# Patient Record
Sex: MALE | Race: WHITE | NOT HISPANIC OR LATINO
[De-identification: names, ages, dates, MRNs, and addresses within clinical notes are randomized per-mention and may not be internally consistent; named-entity substitution may affect disease eponyms.]

---

## 2018-12-02 ENCOUNTER — RECORD ABSTRACTING (OUTPATIENT)
Age: 73
End: 2018-12-02

## 2018-12-02 DIAGNOSIS — Z80.0 FAMILY HISTORY OF MALIGNANT NEOPLASM OF DIGESTIVE ORGANS: ICD-10-CM

## 2018-12-02 DIAGNOSIS — E66.3 OVERWEIGHT: ICD-10-CM

## 2018-12-02 DIAGNOSIS — Z82.49 FAMILY HISTORY OF ISCHEMIC HEART DISEASE AND OTHER DISEASES OF THE CIRCULATORY SYSTEM: ICD-10-CM

## 2018-12-02 DIAGNOSIS — Z86.39 PERSONAL HISTORY OF OTHER ENDOCRINE, NUTRITIONAL AND METABOLIC DISEASE: ICD-10-CM

## 2018-12-02 DIAGNOSIS — Z82.0 FAMILY HISTORY OF EPILEPSY AND OTHER DISEASES OF THE NERVOUS SYSTEM: ICD-10-CM

## 2018-12-02 DIAGNOSIS — E66.09 OTHER OBESITY DUE TO EXCESS CALORIES: ICD-10-CM

## 2018-12-02 DIAGNOSIS — Z83.3 FAMILY HISTORY OF DIABETES MELLITUS: ICD-10-CM

## 2018-12-02 RX ORDER — BENZOCAINE/BENZALKONIUM/ALOE/E 5 %-0.13 %
10 CREAM (GRAM) TOPICAL
Refills: 0 | Status: ACTIVE | COMMUNITY

## 2018-12-02 RX ORDER — VITS A,C,E/LUTEIN/MINERALS 300MCG-200
TABLET ORAL
Refills: 0 | Status: ACTIVE | COMMUNITY

## 2018-12-02 RX ORDER — OMEGA-3/DHA/EPA/FISH OIL 300-1000MG
1000 CAPSULE ORAL
Refills: 0 | Status: ACTIVE | COMMUNITY

## 2018-12-13 ENCOUNTER — APPOINTMENT (OUTPATIENT)
Dept: ENDOCRINOLOGY | Facility: CLINIC | Age: 73
End: 2018-12-13
Payer: MEDICARE

## 2018-12-13 ENCOUNTER — RX RENEWAL (OUTPATIENT)
Age: 73
End: 2018-12-13

## 2018-12-13 VITALS
HEART RATE: 70 BPM | SYSTOLIC BLOOD PRESSURE: 126 MMHG | HEIGHT: 73 IN | BODY MASS INDEX: 30.22 KG/M2 | WEIGHT: 228 LBS | DIASTOLIC BLOOD PRESSURE: 64 MMHG

## 2018-12-13 PROCEDURE — 99214 OFFICE O/P EST MOD 30 MIN: CPT | Mod: 25

## 2018-12-13 PROCEDURE — 95250 CONT GLUC MNTR PHYS/QHP EQP: CPT

## 2018-12-13 RX ORDER — LIRAGLUTIDE 6 MG/ML
18 INJECTION SUBCUTANEOUS DAILY
Refills: 0 | Status: DISCONTINUED | COMMUNITY
End: 2018-12-13

## 2019-02-20 ENCOUNTER — MEDICATION RENEWAL (OUTPATIENT)
Age: 74
End: 2019-02-20

## 2019-03-14 ENCOUNTER — APPOINTMENT (OUTPATIENT)
Dept: ENDOCRINOLOGY | Facility: CLINIC | Age: 74
End: 2019-03-14

## 2019-03-15 ENCOUNTER — APPOINTMENT (OUTPATIENT)
Dept: ENDOCRINOLOGY | Facility: CLINIC | Age: 74
End: 2019-03-15

## 2019-04-03 ENCOUNTER — RX RENEWAL (OUTPATIENT)
Age: 74
End: 2019-04-03

## 2019-04-24 ENCOUNTER — RX RENEWAL (OUTPATIENT)
Age: 74
End: 2019-04-24

## 2019-04-24 RX ORDER — INSULIN LISPRO 200 [IU]/ML
200 INJECTION, SOLUTION SUBCUTANEOUS
Qty: 12 | Refills: 2 | Status: DISCONTINUED | COMMUNITY
Start: 2019-04-03 | End: 2019-04-24

## 2019-04-24 RX ORDER — INSULIN LISPRO 100 [IU]/ML
100 INJECTION, SOLUTION INTRAVENOUS; SUBCUTANEOUS
Refills: 0 | Status: DISCONTINUED | COMMUNITY
End: 2019-04-24

## 2019-05-11 ENCOUNTER — RX RENEWAL (OUTPATIENT)
Age: 74
End: 2019-05-11

## 2019-05-29 ENCOUNTER — RX RENEWAL (OUTPATIENT)
Age: 74
End: 2019-05-29

## 2019-07-10 ENCOUNTER — APPOINTMENT (OUTPATIENT)
Dept: ENDOCRINOLOGY | Facility: CLINIC | Age: 74
End: 2019-07-10
Payer: MEDICARE

## 2019-07-10 VITALS
DIASTOLIC BLOOD PRESSURE: 82 MMHG | SYSTOLIC BLOOD PRESSURE: 132 MMHG | BODY MASS INDEX: 30.48 KG/M2 | HEART RATE: 66 BPM | WEIGHT: 230 LBS | HEIGHT: 73 IN

## 2019-07-10 LAB
GLUCOSE BLDC GLUCOMTR-MCNC: 88
HBA1C MFR BLD HPLC: 8

## 2019-07-10 PROCEDURE — 99215 OFFICE O/P EST HI 40 MIN: CPT | Mod: 25

## 2019-07-10 PROCEDURE — 82962 GLUCOSE BLOOD TEST: CPT

## 2019-07-10 PROCEDURE — 83036 HEMOGLOBIN GLYCOSYLATED A1C: CPT | Mod: QW

## 2019-07-10 PROCEDURE — 95251 CONT GLUC MNTR ANALYSIS I&R: CPT

## 2019-07-10 RX ORDER — SIMVASTATIN 40 MG/1
40 TABLET, FILM COATED ORAL DAILY
Refills: 0 | Status: DISCONTINUED | COMMUNITY
End: 2019-07-10

## 2019-07-10 RX ORDER — DULAGLUTIDE 1.5 MG/.5ML
1.5 INJECTION, SOLUTION SUBCUTANEOUS
Refills: 0 | Status: DISCONTINUED | COMMUNITY
End: 2019-07-10

## 2019-07-10 RX ORDER — INSULIN GLARGINE 300 U/ML
300 INJECTION, SOLUTION SUBCUTANEOUS AT BEDTIME
Refills: 0 | Status: DISCONTINUED | COMMUNITY
End: 2019-07-10

## 2019-07-12 NOTE — HISTORY OF PRESENT ILLNESS
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 [place]\par        9/18 reviewed \par        Severe Nonproliferative Diabetes in OD/OS stable. \par        Cystoid macular edema stable. \par        does not want free style adenike as he is too active , however willing to try dexcom \par        type 2 DM for almost 30 years "early 90's " first few years on Metformin only, complicated by PDR and neuropathy \par        Humalog Subcutaneous, used to take before breakfast 15-20units..........35-45 some times 50 units for supper\par        last Hga1c 8.7 on 7/20/18, 8.6 on 10/18\par        labs reviewed\par        tried Victoza made him sick \par        Toujeo 30units qhs changed few days ago see phone notes\par        Metformin ER 500mg 2tab bid \par        Trulicity 1.5mg once a week for 2 weeks dose increased, has had some GERD\par        Humalog SS \par        Humalog before BREAKFAST:\par        <150 take 0 units \par        151-200 take 2units \par        201-250 take 3units \par        251-300 take 4units \par        301-350 take 5units \par        351-400 take 6units \par        401-450 take 7units\par        >450 take 8 units and call doctor's office. \par        Humalog before LUNCH:\par        <120 take 0 units \par        121-170 take 2units \par        171-220 take 4units \par        221-270 take 6units \par        271-320 take 8units \par        321-370 take 10units \par        371-420 take 12units\par        >420 take 14 units and call doctor's office. \par        Humalog before DINNER:\par        <80 TAKE 0 UNITS\par         TAKE 2 UNITS\par        101-150 TAKE 4 UNITS\par        151-200 TAKE 6 UNITS\par        201-250 TAKE 8 UNITS\par        251-300 TAKE 10 UNITS \par        301-350 TAKE 12 UNITS\par        351-400 TAKE 14 UNITS \par        Fax BG log in 1 week. \par        Checks BS tid none at bedtime\par        avg BS  with no pattern \par        has lows once a week\par        no bedtime sugars checked\par        feels low some days and  per pt \par        Microvascular complications: \par        Nephropathy denies normal Cr 3/16\par        Neuropathy symptoms, denies seen Podiatry long time ago was a waste of time per pt \par        Retinopathy yes s/p lasser tx and eye shots sees Retina specialist Dr [facility] \par        Macrovascular complications: \par        HTN on Atacand-HCTZ, Amlodipine, Coreg CR high today repeated better\par        CAD/CVA denies\par        Lipids at goal on Simvastatin 40mg qhs with LFT's normal\par        denies h/o pancreatitis\par        denies FHx medullary thyroid cancer.

## 2019-07-12 NOTE — PHYSICAL EXAM
[Alert] : alert [No Acute Distress] : no acute distress [Well Nourished] : well nourished [Well Developed] : well developed [EOMI] : extra ocular movement intact [Normal Sclera/Conjunctiva] : normal sclera/conjunctiva [No Proptosis] : no proptosis [Normal Oropharynx] : the oropharynx was normal [Thyroid Not Enlarged] : the thyroid was not enlarged [No Respiratory Distress] : no respiratory distress [No Thyroid Nodules] : there were no palpable thyroid nodules [No Accessory Muscle Use] : no accessory muscle use [Clear to Auscultation] : lungs were clear to auscultation bilaterally [Normal Rate] : heart rate was normal  [Normal S1, S2] : normal S1 and S2 [Regular Rhythm] : with a regular rhythm [Pedal Pulses Normal] : the pedal pulses are present [No Edema] : there was no peripheral edema [Normal Bowel Sounds] : normal bowel sounds [Not Tender] : non-tender [Soft] : abdomen soft [Not Distended] : not distended [Post Cervical Nodes] : posterior cervical nodes [Anterior Cervical Nodes] : anterior cervical nodes [Axillary Nodes] : axillary nodes [No Spinal Tenderness] : no spinal tenderness [Spine Straight] : spine straight [No Stigmata of Cushings Syndrome] : no stigmata of cushings syndrome [Normal Gait] : normal gait [Normal Strength/Tone] : muscle strength and tone were normal [No Rash] : no rash [Acanthosis Nigricans] : no acanthosis nigricans [Right Foot Was Examined] : right foot ~C was examined [Left Foot Was Examined] : left foot ~C was examined [Normal] : normal [Full ROM] : with full range of motion [2+] : 2+ in the dorsalis pedis [Vibration Dec.] : normal vibratory sensation at the level of the toes [Diminished Throughout Both Feet] : normal tactile sensation with monofilament testing throughout both feet [Position Sense Dec.] : normal position sense at the level of the toes [Normal Sensation on Monofilament Testing] : normal sensation on monofilament testing of lower extremities [Normal Reflexes] : deep tendon reflexes were 2+ and symmetric [No Tremors] : no tremors [Oriented x3] : oriented to person, place, and time

## 2019-07-15 ENCOUNTER — MEDICATION RENEWAL (OUTPATIENT)
Age: 74
End: 2019-07-15

## 2019-08-14 ENCOUNTER — APPOINTMENT (OUTPATIENT)
Dept: ENDOCRINOLOGY | Facility: CLINIC | Age: 74
End: 2019-08-14
Payer: MEDICARE

## 2019-08-14 VITALS
HEART RATE: 62 BPM | DIASTOLIC BLOOD PRESSURE: 70 MMHG | WEIGHT: 230 LBS | HEIGHT: 73 IN | SYSTOLIC BLOOD PRESSURE: 122 MMHG | BODY MASS INDEX: 30.48 KG/M2 | OXYGEN SATURATION: 98 % | RESPIRATION RATE: 16 BRPM

## 2019-08-14 LAB — GLUCOSE BLDC GLUCOMTR-MCNC: 97

## 2019-08-14 PROCEDURE — 95251 CONT GLUC MNTR ANALYSIS I&R: CPT

## 2019-08-14 PROCEDURE — 99215 OFFICE O/P EST HI 40 MIN: CPT | Mod: 25

## 2019-08-14 PROCEDURE — 82962 GLUCOSE BLOOD TEST: CPT

## 2019-08-19 ENCOUNTER — MEDICATION RENEWAL (OUTPATIENT)
Age: 74
End: 2019-08-19

## 2019-08-23 NOTE — HISTORY OF PRESENT ILLNESS
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 after dinner per tp he was never that low, feels low with BS 80s\par never got dexcom, he received 2 calls from the company and was told that the Dexcom will be shipped to his home a month ago, however never came \par        coming for a sooner f/u as he started Omnipod insulin pump , likes it very much thankful\par insulin pump downloaded and reviewed with the pt, copy scanned in the chart \par    has bedtime snacks with no bolusing , also not good with counting CHO does aproximation "big meal 75g CHO, small meal snack et"\par        saw optho [facility] Dr. [place]\par        9/18 reviewed \par        Severe Nonproliferative Diabetes in OD/OS stable. \par        Cystoid macular edema stable. \par        does not want free style adenike as he is too active , however willing to try dexcom \par        type 2 DM for almost 30 years "early 90's " first few years on Metformin only, complicated by PDR and neuropathy \par        Humalog Subcutaneous, used to take before breakfast 15-20units..........35-45 some times 50 units for supper\par        last Hga1c 8.7 on 7/20/18, 8.6 on 10/18\par        labs reviewed\par        tried Victoza made him sick \par        Toujeo 30units qhs changed few days ago see phone notes\par        Metformin ER 500mg 2tab bid \par        Trulicity 1.5mg once a week for 2 weeks dose increased, has had some GERD\par        Humalog SS \par        Humalog before BREAKFAST:\par        <150 take 0 units \par        151-200 take 2units \par        201-250 take 3units \par        251-300 take 4units \par        301-350 take 5units \par        351-400 take 6units \par        401-450 take 7units\par        >450 take 8 units and call doctor's office. \par        Humalog before LUNCH:\par        <120 take 0 units \par        121-170 take 2units \par        171-220 take 4units \par        221-270 take 6units \par        271-320 take 8units \par        321-370 take 10units \par        371-420 take 12units\par        >420 take 14 units and call doctor's office. \par        Humalog before DINNER:\par        <80 TAKE 0 UNITS\par         TAKE 2 UNITS\par        101-150 TAKE 4 UNITS\par        151-200 TAKE 6 UNITS\par        201-250 TAKE 8 UNITS\par        251-300 TAKE 10 UNITS \par        301-350 TAKE 12 UNITS\par        351-400 TAKE 14 UNITS \par        Fax BG log in 1 week. \par        Checks BS tid none at bedtime\par        avg BS  with no pattern \par        has lows once a week\par        no bedtime sugars checked\par        feels low some days and  per pt \par        Microvascular complications: \par        Nephropathy denies normal Cr 3/16\par        Neuropathy symptoms, denies seen Podiatry long time ago was a waste of time per pt \par        Retinopathy yes s/p lasser tx and eye shots sees Retina specialist Dr [facility] \par        Macrovascular complications: \par        HTN on Atacand-HCTZ, Amlodipine, Coreg CR high today repeated better\par        CAD/CVA denies\par        Lipids at goal on Simvastatin 40mg qhs with LFT's normal\par        denies h/o pancreatitis\par        denies FHx medullary thyroid cancer.

## 2019-08-23 NOTE — HISTORY OF PRESENT ILLNESS
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 after dinner per tp he was never that low, feels low with BS 80s\par never got dexcom, he received 2 calls from the company and was told that the Dexcom will be shipped to his home a month ago, however never came \par        coming for a sooner f/u as he started Omnipod insulin pump , likes it very much thankful\par insulin pump downloaded and reviewed with the pt, copy scanned in the chart \par    has bedtime snacks with no bolusing , also not good with counting CHO does aproximation "big meal 75g CHO, small meal snack et"\par        saw optho [facility] Dr. [place]\par        9/18 reviewed \par        Severe Nonproliferative Diabetes in OD/OS stable. \par        Cystoid macular edema stable. \par        does not want free style adenike as he is too active , however willing to try dexcom \par        type 2 DM for almost 30 years "early 90's " first few years on Metformin only, complicated by PDR and neuropathy \par        Humalog Subcutaneous, used to take before breakfast 15-20units..........35-45 some times 50 units for supper\par        last Hga1c 8.7 on 7/20/18, 8.6 on 10/18\par        labs reviewed\par        tried Victoza made him sick \par        Toujeo 30units qhs changed few days ago see phone notes\par        Metformin ER 500mg 2tab bid \par        Trulicity 1.5mg once a week for 2 weeks dose increased, has had some GERD\par        Humalog SS \par        Humalog before BREAKFAST:\par        <150 take 0 units \par        151-200 take 2units \par        201-250 take 3units \par        251-300 take 4units \par        301-350 take 5units \par        351-400 take 6units \par        401-450 take 7units\par        >450 take 8 units and call doctor's office. \par        Humalog before LUNCH:\par        <120 take 0 units \par        121-170 take 2units \par        171-220 take 4units \par        221-270 take 6units \par        271-320 take 8units \par        321-370 take 10units \par        371-420 take 12units\par        >420 take 14 units and call doctor's office. \par        Humalog before DINNER:\par        <80 TAKE 0 UNITS\par         TAKE 2 UNITS\par        101-150 TAKE 4 UNITS\par        151-200 TAKE 6 UNITS\par        201-250 TAKE 8 UNITS\par        251-300 TAKE 10 UNITS \par        301-350 TAKE 12 UNITS\par        351-400 TAKE 14 UNITS \par        Fax BG log in 1 week. \par        Checks BS tid none at bedtime\par        avg BS  with no pattern \par        has lows once a week\par        no bedtime sugars checked\par        feels low some days and  per pt \par        Microvascular complications: \par        Nephropathy denies normal Cr 3/16\par        Neuropathy symptoms, denies seen Podiatry long time ago was a waste of time per pt \par        Retinopathy yes s/p lasser tx and eye shots sees Retina specialist Dr [facility] \par        Macrovascular complications: \par        HTN on Atacand-HCTZ, Amlodipine, Coreg CR high today repeated better\par        CAD/CVA denies\par        Lipids at goal on Simvastatin 40mg qhs with LFT's normal\par        denies h/o pancreatitis\par        denies FHx medullary thyroid cancer.

## 2019-08-23 NOTE — PHYSICAL EXAM
[Alert] : alert [No Acute Distress] : no acute distress [Well Nourished] : well nourished [Well Developed] : well developed [Normal Sclera/Conjunctiva] : normal sclera/conjunctiva [No Proptosis] : no proptosis [EOMI] : extra ocular movement intact [Normal Oropharynx] : the oropharynx was normal [No Thyroid Nodules] : there were no palpable thyroid nodules [Thyroid Not Enlarged] : the thyroid was not enlarged [No Respiratory Distress] : no respiratory distress [No Accessory Muscle Use] : no accessory muscle use [Clear to Auscultation] : lungs were clear to auscultation bilaterally [Normal Rate] : heart rate was normal  [Regular Rhythm] : with a regular rhythm [Normal S1, S2] : normal S1 and S2 [Pedal Pulses Normal] : the pedal pulses are present [No Edema] : there was no peripheral edema [Normal Bowel Sounds] : normal bowel sounds [Not Tender] : non-tender [Soft] : abdomen soft [Not Distended] : not distended [Post Cervical Nodes] : posterior cervical nodes [Anterior Cervical Nodes] : anterior cervical nodes [Axillary Nodes] : axillary nodes [Spine Straight] : spine straight [No Spinal Tenderness] : no spinal tenderness [No Stigmata of Cushings Syndrome] : no stigmata of cushings syndrome [Normal Gait] : normal gait [Normal Strength/Tone] : muscle strength and tone were normal [No Rash] : no rash [Right Foot Was Examined] : right foot ~C was examined [Left Foot Was Examined] : left foot ~C was examined [Normal] : normal [Full ROM] : with full range of motion [2+] : 2+ in the dorsalis pedis [Normal Reflexes] : deep tendon reflexes were 2+ and symmetric [No Tremors] : no tremors [Oriented x3] : oriented to person, place, and time [Normal Sensation on Monofilament Testing] : normal sensation on monofilament testing of lower extremities [Acanthosis Nigricans] : no acanthosis nigricans [Vibration Dec.] : normal vibratory sensation at the level of the toes [Position Sense Dec.] : normal position sense at the level of the toes [Diminished Throughout Both Feet] : normal tactile sensation with monofilament testing throughout both feet

## 2019-08-23 NOTE — PHYSICAL EXAM
[Alert] : alert [No Acute Distress] : no acute distress [Well Nourished] : well nourished [Well Developed] : well developed [Normal Sclera/Conjunctiva] : normal sclera/conjunctiva [No Proptosis] : no proptosis [EOMI] : extra ocular movement intact [Normal Oropharynx] : the oropharynx was normal [No Thyroid Nodules] : there were no palpable thyroid nodules [Thyroid Not Enlarged] : the thyroid was not enlarged [No Respiratory Distress] : no respiratory distress [No Accessory Muscle Use] : no accessory muscle use [Clear to Auscultation] : lungs were clear to auscultation bilaterally [Normal Rate] : heart rate was normal  [Regular Rhythm] : with a regular rhythm [Normal S1, S2] : normal S1 and S2 [Pedal Pulses Normal] : the pedal pulses are present [No Edema] : there was no peripheral edema [Normal Bowel Sounds] : normal bowel sounds [Not Tender] : non-tender [Soft] : abdomen soft [Not Distended] : not distended [Post Cervical Nodes] : posterior cervical nodes [Anterior Cervical Nodes] : anterior cervical nodes [Axillary Nodes] : axillary nodes [No Spinal Tenderness] : no spinal tenderness [Spine Straight] : spine straight [No Stigmata of Cushings Syndrome] : no stigmata of cushings syndrome [Normal Gait] : normal gait [Normal Strength/Tone] : muscle strength and tone were normal [No Rash] : no rash [Right Foot Was Examined] : right foot ~C was examined [Left Foot Was Examined] : left foot ~C was examined [Normal] : normal [Full ROM] : with full range of motion [2+] : 2+ in the dorsalis pedis [Normal Reflexes] : deep tendon reflexes were 2+ and symmetric [No Tremors] : no tremors [Oriented x3] : oriented to person, place, and time [Normal Sensation on Monofilament Testing] : normal sensation on monofilament testing of lower extremities [Acanthosis Nigricans] : no acanthosis nigricans [Vibration Dec.] : normal vibratory sensation at the level of the toes [Position Sense Dec.] : normal position sense at the level of the toes [Diminished Throughout Both Feet] : normal tactile sensation with monofilament testing throughout both feet

## 2019-08-26 ENCOUNTER — RX RENEWAL (OUTPATIENT)
Age: 74
End: 2019-08-26

## 2019-08-27 ENCOUNTER — RX RENEWAL (OUTPATIENT)
Age: 74
End: 2019-08-27

## 2019-09-21 ENCOUNTER — LABORATORY RESULT (OUTPATIENT)
Age: 74
End: 2019-09-21

## 2019-10-09 ENCOUNTER — RX RENEWAL (OUTPATIENT)
Age: 74
End: 2019-10-09

## 2019-11-13 ENCOUNTER — APPOINTMENT (OUTPATIENT)
Dept: INTERNAL MEDICINE | Facility: CLINIC | Age: 74
End: 2019-11-13
Payer: MEDICARE

## 2019-11-13 VITALS
HEIGHT: 72 IN | DIASTOLIC BLOOD PRESSURE: 68 MMHG | SYSTOLIC BLOOD PRESSURE: 124 MMHG | BODY MASS INDEX: 31.15 KG/M2 | WEIGHT: 230 LBS

## 2019-11-13 PROCEDURE — 99213 OFFICE O/P EST LOW 20 MIN: CPT

## 2019-11-13 NOTE — PHYSICAL EXAM
[No Acute Distress] : no acute distress [Well Nourished] : well nourished [Well Developed] : well developed [Well-Appearing] : well-appearing [Normal Sclera/Conjunctiva] : normal sclera/conjunctiva [PERRL] : pupils equal round and reactive to light [EOMI] : extraocular movements intact [Normal Outer Ear/Nose] : the outer ears and nose were normal in appearance [Normal Oropharynx] : the oropharynx was normal [No JVD] : no jugular venous distention [No Lymphadenopathy] : no lymphadenopathy [Supple] : supple [Thyroid Normal, No Nodules] : the thyroid was normal and there were no nodules present [No Respiratory Distress] : no respiratory distress  [No Accessory Muscle Use] : no accessory muscle use [Clear to Auscultation] : lungs were clear to auscultation bilaterally [Normal Rate] : normal rate  [Regular Rhythm] : with a regular rhythm [Normal S1, S2] : normal S1 and S2 [No Murmur] : no murmur heard [No Carotid Bruits] : no carotid bruits [No Abdominal Bruit] : a ~M bruit was not heard ~T in the abdomen [No Varicosities] : no varicosities [Pedal Pulses Present] : the pedal pulses are present [No Edema] : there was no peripheral edema [No Palpable Aorta] : no palpable aorta [No Extremity Clubbing/Cyanosis] : no extremity clubbing/cyanosis [Soft] : abdomen soft [Non Tender] : non-tender [Non-distended] : non-distended [No Masses] : no abdominal mass palpated [No HSM] : no HSM [Normal Bowel Sounds] : normal bowel sounds [Normal Posterior Cervical Nodes] : no posterior cervical lymphadenopathy [Normal Anterior Cervical Nodes] : no anterior cervical lymphadenopathy [No CVA Tenderness] : no CVA  tenderness [No Spinal Tenderness] : no spinal tenderness [No Joint Swelling] : no joint swelling [Grossly Normal Strength/Tone] : grossly normal strength/tone [No Rash] : no rash [Coordination Grossly Intact] : coordination grossly intact [Normal Gait] : normal gait [No Focal Deficits] : no focal deficits [Normal Affect] : the affect was normal [Deep Tendon Reflexes (DTR)] : deep tendon reflexes were 2+ and symmetric [Normal Insight/Judgement] : insight and judgment were intact

## 2019-11-13 NOTE — HISTORY OF PRESENT ILLNESS
[de-identified] : Patient is a 74-year-old male diabetic presenting for an annual exam. He has a pump, but even at that he was sick last week, and his sugars were very much up and down. He's just feeling better at this point in time during that period. He had difficulty with high blood sugars, but he also had no chest pain, shortness of breath, exertional dyspnea. He just felt completely washed out. He has no difficulties at this time with his bowels and bladder may be some minor difficulties urinating, but otherwise is unremarkable. He has no specific complaints other than that he may fill it if his blood sugar. He, says he's had difficulty with every medication. He was encouraged to talk to Dr. Scar weber.

## 2019-11-13 NOTE — HEALTH RISK ASSESSMENT
[Good] : ~his/her~  mood as  good [No] : No [No falls in past year] : Patient reported no falls in the past year [0] : 2) Feeling down, depressed, or hopeless: Not at all (0) [] : No

## 2019-11-24 ENCOUNTER — RX RENEWAL (OUTPATIENT)
Age: 74
End: 2019-11-24

## 2019-11-25 ENCOUNTER — RX RENEWAL (OUTPATIENT)
Age: 74
End: 2019-11-25

## 2019-11-26 ENCOUNTER — MEDICATION RENEWAL (OUTPATIENT)
Age: 74
End: 2019-11-26

## 2019-12-27 ENCOUNTER — RX RENEWAL (OUTPATIENT)
Age: 74
End: 2019-12-27

## 2020-02-25 ENCOUNTER — RX RENEWAL (OUTPATIENT)
Age: 75
End: 2020-02-25

## 2020-02-29 ENCOUNTER — LABORATORY RESULT (OUTPATIENT)
Age: 75
End: 2020-02-29

## 2020-03-11 ENCOUNTER — APPOINTMENT (OUTPATIENT)
Dept: ENDOCRINOLOGY | Facility: CLINIC | Age: 75
End: 2020-03-11
Payer: MEDICARE

## 2020-03-11 VITALS
DIASTOLIC BLOOD PRESSURE: 70 MMHG | BODY MASS INDEX: 31.56 KG/M2 | WEIGHT: 233 LBS | HEART RATE: 69 BPM | OXYGEN SATURATION: 97 % | SYSTOLIC BLOOD PRESSURE: 129 MMHG | HEIGHT: 72 IN

## 2020-03-11 LAB — GLUCOSE BLDC GLUCOMTR-MCNC: 269

## 2020-03-11 PROCEDURE — 82962 GLUCOSE BLOOD TEST: CPT

## 2020-03-11 PROCEDURE — 95251 CONT GLUC MNTR ANALYSIS I&R: CPT

## 2020-03-11 PROCEDURE — 99215 OFFICE O/P EST HI 40 MIN: CPT | Mod: 25

## 2020-03-11 NOTE — PHYSICAL EXAM
[Alert] : alert [No Acute Distress] : no acute distress [Well Nourished] : well nourished [Well Developed] : well developed [Normal Sclera/Conjunctiva] : normal sclera/conjunctiva [EOMI] : extra ocular movement intact [No Proptosis] : no proptosis [Normal Oropharynx] : the oropharynx was normal [Thyroid Not Enlarged] : the thyroid was not enlarged [No Thyroid Nodules] : there were no palpable thyroid nodules [No Respiratory Distress] : no respiratory distress [No Accessory Muscle Use] : no accessory muscle use [Clear to Auscultation] : lungs were clear to auscultation bilaterally [Normal Rate] : heart rate was normal  [Normal S1, S2] : normal S1 and S2 [Regular Rhythm] : with a regular rhythm [Pedal Pulses Normal] : the pedal pulses are present [No Edema] : there was no peripheral edema [Normal Bowel Sounds] : normal bowel sounds [Not Tender] : non-tender [Soft] : abdomen soft [Not Distended] : not distended [Post Cervical Nodes] : posterior cervical nodes [Anterior Cervical Nodes] : anterior cervical nodes [Axillary Nodes] : axillary nodes [No Spinal Tenderness] : no spinal tenderness [Spine Straight] : spine straight [No Stigmata of Cushings Syndrome] : no stigmata of cushings syndrome [Normal Gait] : normal gait [Normal Strength/Tone] : muscle strength and tone were normal [No Rash] : no rash [Right Foot Was Examined] : right foot ~C was examined [Left Foot Was Examined] : left foot ~C was examined [Normal] : normal [Full ROM] : with full range of motion [2+] : 2+ in the dorsalis pedis [Normal Reflexes] : deep tendon reflexes were 2+ and symmetric [No Tremors] : no tremors [Normal Sensation on Monofilament Testing] : normal sensation on monofilament testing of lower extremities [Oriented x3] : oriented to person, place, and time [Acanthosis Nigricans] : no acanthosis nigricans [Vibration Dec.] : normal vibratory sensation at the level of the toes [Position Sense Dec.] : normal position sense at the level of the toes [Diminished Throughout Both Feet] : normal tactile sensation with monofilament testing throughout both feet

## 2020-03-11 NOTE — HISTORY OF PRESENT ILLNESS
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, too variable BS without a trend , has a busy schedule , physically active at work only \par had a pump failure episode, received a new one where his basal rates are slightly higher , however his sugars are still high\par per pt he does not have any more the settings for small, medium and high meal, also for the snack\par the old pod closed so we were not able to review except the large meal, I added the rest per pt memory \par I advised the pt to double check when he goes home\par \par does have days with no BS checked, [place] the city [place] BS was 300 when he came back\par from insulin pump download BS 25? after dinner per tp he was never that low, feels low with BS 80s\par has Dexcom at home, never got the training \par        coming for a sooner f/u as he started Omnipod insulin pump , likes it very much thankful\par insulin pump downloaded and reviewed with the pt, copy scanned in the chart \par    has bedtime snacks with no bolusing , also not good with counting CHO does aproximation "big meal 75g CHO, small meal snack et"\par        saw optho [facility] Dr. O[place]ied[place]\par        9/18 reviewed \par        Severe Nonproliferative Diabetes in OD/OS stable. \par        Cystoid macular edema stable. \par        does not want free style adenike as he is too active , however willing to try dexcom \par        type 2 DM for almost 30 years "early 90's " first few years on Metformin only, complicated by PDR and neuropathy \par        Humalog Subcutaneous, used to take before breakfast 15-20units..........35-45 some times 50 units for supper\par        last Hga1c 8.7 on 7/20/18, 8.6 on 10/18\par        labs reviewed\par        tried Victoza made him sick \par        Toujeo 30units qhs changed few days ago see phone notes\par        Metformin ER 500mg 2tab bid \par        Trulicity 1.5mg once a week for 2 weeks dose increased, has had some GERD\par      \par       \par        Microvascular complications: \par        Nephropathy denies normal Cr 3/16\par        Neuropathy symptoms, denies seen Podiatry long time ago was a waste of time per pt \par        Retinopathy yes s/p lasser tx and eye shots sees Retina specialist Dr [facility] \par        Macrovascular complications: \par        HTN on Atacand-HCTZ, Amlodipine, Coreg CR high today repeated better\par        CAD/CVA denies\par        Lipids at goal on Simvastatin 40mg qhs with LFT's normal\par        denies h/o pancreatitis\par        denies FHx medullary thyroid cancer.

## 2020-04-26 ENCOUNTER — RX RENEWAL (OUTPATIENT)
Age: 75
End: 2020-04-26

## 2020-04-28 ENCOUNTER — APPOINTMENT (OUTPATIENT)
Dept: ENDOCRINOLOGY | Facility: CLINIC | Age: 75
End: 2020-04-28

## 2020-06-02 ENCOUNTER — RESULT CHARGE (OUTPATIENT)
Age: 75
End: 2020-06-02

## 2020-06-02 ENCOUNTER — APPOINTMENT (OUTPATIENT)
Dept: ENDOCRINOLOGY | Facility: CLINIC | Age: 75
End: 2020-06-02
Payer: MEDICARE

## 2020-06-02 VITALS
SYSTOLIC BLOOD PRESSURE: 126 MMHG | HEIGHT: 72 IN | BODY MASS INDEX: 32.37 KG/M2 | DIASTOLIC BLOOD PRESSURE: 78 MMHG | TEMPERATURE: 97.7 F | WEIGHT: 239 LBS | OXYGEN SATURATION: 97 % | HEART RATE: 64 BPM

## 2020-06-02 LAB — GLUCOSE BLDC GLUCOMTR-MCNC: 113

## 2020-06-02 PROCEDURE — 99215 OFFICE O/P EST HI 40 MIN: CPT

## 2020-06-02 NOTE — HISTORY OF PRESENT ILLNESS
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, too variable BS without a trend , has a busy schedule , physically active at work only \par does boluses with his OMNIPOD without checking sugar values, variable -300\par has DEXCOM from 11/2019  never received the training \par had a pump failure episode, received a new one where his basal rates are slightly higher , however his sugars are still high\par per pt he does not have any more the settings for small, medium and high meal, also for the snack\par the old pod closed so we were not able to review except the large meal, I added the rest per pt memory \par I advised the pt to double check when he goes home\par \par does have days with no BS checked, [place] the city [place] meter BS was 300 when he came back\par from insulin pump download BS 25? after dinner per tp he was never that low, feels low with BS 80s\par has Dexcom at home, never got the training \par        coming for a sooner f/u as he started Omnipod insulin pump , likes it very much thankful\par insulin pump downloaded and reviewed with the pt, copy scanned in the chart \par    has bedtime snacks with no bolusing , also not good with counting CHO does aproximation "big meal 75g CHO, small meal snack et"\par        saw optho [facility] Dr. O[place]iedo\par        9/18 reviewed \par        Severe Nonproliferative Diabetes in OD/OS stable. \par        Cystoid macular edema stable. \par        does not want free style adenike as he is too active , however willing to try dexcom \par        type 2 DM for almost 30 years "early 90's " first few years on Metformin only, complicated by PDR and neuropathy \par        Humalog Subcutaneous, used to take before breakfast 15-20units..........35-45 some times 50 units for supper\par        last Hga1c 8.7 on 7/20/18, 8.6 on 10/18\par        labs reviewed\par        tried Victoza made him sick \par        Toujeo 30units qhs changed few days ago see phone notes\par        Metformin ER 500mg 2tab bid \par        Trulicity 1.5mg once a week for 2 weeks dose increased, has had some GERD\par      \par       \par        Microvascular complications: \par        Nephropathy denies normal Cr 3/16\par        Neuropathy symptoms, denies seen Podiatry long time ago was a waste of time per pt \par        Retinopathy yes s/p lasser tx and eye shots sees Retina specialist Dr [facility] \par        Macrovascular complications: \par        HTN on Atacand-HCTZ, Amlodipine, Coreg CR high today repeated better\par        CAD/CVA denies\par        Lipids at goal on Simvastatin 40mg qhs with LFT's normal\par        denies h/o pancreatitis\par        denies FHx medullary thyroid cancer.

## 2020-06-05 ENCOUNTER — RX RENEWAL (OUTPATIENT)
Age: 75
End: 2020-06-05

## 2020-06-15 ENCOUNTER — RX RENEWAL (OUTPATIENT)
Age: 75
End: 2020-06-15

## 2020-06-16 ENCOUNTER — RX RENEWAL (OUTPATIENT)
Age: 75
End: 2020-06-16

## 2020-07-08 ENCOUNTER — RX RENEWAL (OUTPATIENT)
Age: 75
End: 2020-07-08

## 2020-07-23 ENCOUNTER — RESULT CHARGE (OUTPATIENT)
Age: 75
End: 2020-07-23

## 2020-07-23 ENCOUNTER — APPOINTMENT (OUTPATIENT)
Dept: ENDOCRINOLOGY | Facility: CLINIC | Age: 75
End: 2020-07-23
Payer: MEDICARE

## 2020-07-23 VITALS
HEART RATE: 66 BPM | BODY MASS INDEX: 31.97 KG/M2 | HEIGHT: 72 IN | SYSTOLIC BLOOD PRESSURE: 124 MMHG | OXYGEN SATURATION: 98 % | WEIGHT: 236 LBS | DIASTOLIC BLOOD PRESSURE: 70 MMHG | TEMPERATURE: 98 F

## 2020-07-23 LAB
GLUCOSE BLDC GLUCOMTR-MCNC: 132
HBA1C MFR BLD HPLC: 8.9

## 2020-07-23 PROCEDURE — G0447 BEHAVIOR COUNSEL OBESITY 15M: CPT

## 2020-07-23 NOTE — ASSESSMENT
[Long Term Vascular Complications] : long term vascular complications of diabetes [Self Monitoring of Blood Glucose] : self monitoring of blood glucose [Weight Loss] : weight loss

## 2020-07-23 NOTE — HISTORY OF PRESENT ILLNESS
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, too variable BS without a trend , has a busy schedule , physically active at work only , eating bed diet too\par does bolusing without getting the sugar in the bolus wizard "I am too busy and my hands are dirty" \par does boluses with his OMNIPOD without checking sugar values, variable -300\par has DEXCOM from 11/2019  never received the training \par had a pump failure episode, received a new one where his basal rates are slightly higher , however his sugars are still high\par per pt he does not have any more the settings for small, medium and high meal, also for the snack\par the old pod closed so we were not able to review except the large meal, I added the rest per pt memory \par I advised the pt to double check when he goes home\par \par does have days with no BS checked, [place] the city [place] meter BS was 300 when he came back\par from insulin pump download BS 25? after dinner per tp he was never that low, feels low with BS 80s\par has Dexcom at home, never got the training \par        coming for a sooner f/u as he started Omnipod insulin pump , likes it very much thankful\par insulin pump downloaded and reviewed with the pt, copy scanned in the chart \par    has bedtime snacks with no bolusing , also not good with counting CHO does aproximation "big meal 75g CHO, small meal snack et"\par        saw optho [facility] Dr. [place]\par        9/18 reviewed \par        Severe Nonproliferative Diabetes in OD/OS stable. \par        Cystoid macular edema stable. \par        does not want free style adenike as he is too active , however willing to try dexcom \par        type 2 DM for almost 30 years "early 90's " first few years on Metformin only, complicated by PDR and neuropathy \par        Humalog Subcutaneous, used to take before breakfast 15-20units..........35-45 some times 50 units for supper\par        last Hga1c 8.7 on 7/20/18, 8.6 on 10/18\par        labs reviewed\par        tried Victoza made him sick \par        Toujeo 30units qhs changed few days ago see phone notes\par        Metformin ER 500mg 2tab bid \par        Trulicity 1.5mg once a week for 2 weeks dose increased, has had some GERD\par      \par       \par        Microvascular complications: \par        Nephropathy denies normal Cr 3/16\par        Neuropathy symptoms, denies seen Podiatry long time ago was a waste of time per pt \par        Retinopathy yes s/p lasser tx and eye shots sees Retina specialist Dr [facility] \par        Macrovascular complications: \par        HTN on Atacand-HCTZ, Amlodipine, Coreg CR high today repeated better\par        CAD/CVA denies\par        Lipids at goal on Simvastatin 40mg qhs with LFT's normal\par        denies h/o pancreatitis\par        denies FHx medullary thyroid cancer.

## 2020-08-06 ENCOUNTER — APPOINTMENT (OUTPATIENT)
Dept: ENDOCRINOLOGY | Facility: CLINIC | Age: 75
End: 2020-08-06
Payer: MEDICARE

## 2020-08-06 PROCEDURE — ZZZZZ: CPT

## 2020-08-06 PROCEDURE — 99213 OFFICE O/P EST LOW 20 MIN: CPT | Mod: 25

## 2020-08-06 PROCEDURE — 95251 CONT GLUC MNTR ANALYSIS I&R: CPT

## 2020-08-19 ENCOUNTER — APPOINTMENT (OUTPATIENT)
Dept: ENDOCRINOLOGY | Facility: CLINIC | Age: 75
End: 2020-08-19
Payer: MEDICARE

## 2020-08-19 PROCEDURE — 97802 MEDICAL NUTRITION INDIV IN: CPT | Mod: 95

## 2020-08-20 ENCOUNTER — APPOINTMENT (OUTPATIENT)
Dept: ENDOCRINOLOGY | Facility: CLINIC | Age: 75
End: 2020-08-20

## 2020-08-20 ENCOUNTER — APPOINTMENT (OUTPATIENT)
Dept: ENDOCRINOLOGY | Facility: CLINIC | Age: 75
End: 2020-08-20
Payer: MEDICARE

## 2020-08-20 PROCEDURE — 95251 CONT GLUC MNTR ANALYSIS I&R: CPT

## 2020-08-20 NOTE — HISTORY OF PRESENT ILLNESS
[FreeTextEntry1] : insulin pump and freestyle adenike downloaded, reviewed with the patient\par does count carbs and uses bolus wizard, gets also BS in for each bolus\par seen CDE at [facility] \par

## 2020-08-20 NOTE — HISTORY OF PRESENT ILLNESS
[FreeTextEntry1] : insulin pump and freestyle adenike downloaded, reviewed with the patient\par does not do CHO counting, does not get his BS and CHO amount in the bolus when he uses bolus wizard, does actually manual boluses\par ot of control diabetes, noncompliant patient \par CDE from [facility] came to assist the patient\par

## 2020-08-27 ENCOUNTER — RESULT CHARGE (OUTPATIENT)
Age: 75
End: 2020-08-27

## 2020-08-27 ENCOUNTER — APPOINTMENT (OUTPATIENT)
Dept: ENDOCRINOLOGY | Facility: CLINIC | Age: 75
End: 2020-08-27
Payer: MEDICARE

## 2020-08-27 VITALS
OXYGEN SATURATION: 98 % | SYSTOLIC BLOOD PRESSURE: 122 MMHG | HEART RATE: 64 BPM | DIASTOLIC BLOOD PRESSURE: 64 MMHG | WEIGHT: 233 LBS | HEIGHT: 72 IN | TEMPERATURE: 97.9 F | BODY MASS INDEX: 31.56 KG/M2

## 2020-08-27 LAB — GLUCOSE BLDC GLUCOMTR-MCNC: 218

## 2020-08-27 PROCEDURE — 95251 CONT GLUC MNTR ANALYSIS I&R: CPT

## 2020-08-27 PROCEDURE — 99215 OFFICE O/P EST HI 40 MIN: CPT | Mod: 25

## 2020-08-27 NOTE — ASSESSMENT
[Diabetes Foot Care] : diabetes foot care [Retinopathy Screening] : Patient was referred to ophthalmology for retinopathy screening

## 2020-08-27 NOTE — HISTORY OF PRESENT ILLNESS
[FreeTextEntry1] : insulin pump and freestyle adenike downloaded, reviewed with the patient\par does count carbs and uses bolus wizard, gets also BS in for each bolus however when he does not know how many CHO has in his meal he still does small meal bolus etc \par seen CDE at [facility] \par no more severe lows, just few in high 60s before dinner\par \par

## 2020-09-28 ENCOUNTER — RX RENEWAL (OUTPATIENT)
Age: 75
End: 2020-09-28

## 2020-11-19 ENCOUNTER — APPOINTMENT (OUTPATIENT)
Dept: ENDOCRINOLOGY | Facility: CLINIC | Age: 75
End: 2020-11-19

## 2020-11-20 ENCOUNTER — RX RENEWAL (OUTPATIENT)
Age: 75
End: 2020-11-20

## 2020-12-23 RX ORDER — BLOOD SUGAR DIAGNOSTIC
STRIP MISCELLANEOUS
Qty: 540 | Refills: 1 | Status: ACTIVE | COMMUNITY
Start: 2019-05-12 | End: 1900-01-01

## 2021-01-28 ENCOUNTER — RESULT CHARGE (OUTPATIENT)
Age: 76
End: 2021-01-28

## 2021-01-28 ENCOUNTER — APPOINTMENT (OUTPATIENT)
Dept: ENDOCRINOLOGY | Facility: CLINIC | Age: 76
End: 2021-01-28
Payer: MEDICARE

## 2021-01-28 VITALS
HEIGHT: 72 IN | OXYGEN SATURATION: 98 % | WEIGHT: 228 LBS | SYSTOLIC BLOOD PRESSURE: 94 MMHG | DIASTOLIC BLOOD PRESSURE: 56 MMHG | HEART RATE: 83 BPM | BODY MASS INDEX: 30.88 KG/M2

## 2021-01-28 PROCEDURE — 95251 CONT GLUC MNTR ANALYSIS I&R: CPT

## 2021-01-28 PROCEDURE — 99072 ADDL SUPL MATRL&STAF TM PHE: CPT

## 2021-01-28 PROCEDURE — 99215 OFFICE O/P EST HI 40 MIN: CPT | Mod: 25

## 2021-01-28 PROCEDURE — G0447 BEHAVIOR COUNSEL OBESITY 15M: CPT | Mod: 59

## 2021-01-28 NOTE — HISTORY OF PRESENT ILLNESS
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, too variable BS without a trend , has a busy schedule , had back surgery 12/18/2020  Dr karimi [facility] now doing PT recovering slowlly\par \par had  physically active at work only , eating bed diet too\par does bolusing without getting the sugar in the bolus wizard "I am too busy and my hands are dirty" \par does boluses with his OMNIPOD without checking sugar values, variable -300\par has DEXCOM from 11/2019 never received the training \par had a pump failure episode, received a new one where his basal rates are slightly higher , however his sugars are still high\par per pt he does not have any more the settings for small, medium and high meal, also for the snack\par the old pod closed so we were not able to review except the large meal, I added the rest per pt memory \par I advised the pt to double check when he goes home\par \par does have days with no BS checked, [place] the city [place] BS was 300 when he came back\par from insulin pump download BS 25? after dinner per tp he was never that low, feels low with BS 80s\par has Dexcom at home, never got the training \par  coming for a sooner f/u as he started Omnipod insulin pump , likes it very much thankful\par insulin pump downloaded and reviewed with the pt, copy scanned in the chart \par  has bedtime snacks with no bolusing , also not good with counting CHO does aproximation "big meal 75g CHO, small meal snack et"\par  saw optho [facility] Dr. [place]\par  9/18 reviewed \par  Severe Nonproliferative Diabetes in OD/OS stable. \par  Cystoid macular edema stable. \par  does not want free style adenike as he is too active , however willing to try dexcom \par  type 2 DM for almost 30 years "early 90's " first few years on Metformin only, complicated by PDR and neuropathy \par  Humalog Subcutaneous, used to take before breakfast 15-20units..........35-45 some times 50 units for supper\par  last Hga1c 8.7 on 7/20/18, 8.6 on 10/18\par  labs reviewed\par  tried Victoza made him sick \par  Toujeo 30units qhs changed few days ago see phone notes\par  Metformin ER 500mg 2tab bid \par  Trulicity 1.5mg once a week for 2 weeks dose increased, has had some GERD\par  \par  \par  Microvascular complications: \par  Nephropathy denies normal Cr 3/16\par  Neuropathy symptoms, denies seen Podiatry long time ago was a waste of time per pt \par  Retinopathy yes s/p lasser tx and eye shots sees Retina specialist Dr [facility] \par  Macrovascular complications: \par  HTN on Atacand-HCTZ, Amlodipine, Coreg CR high today repeated better\par  CAD/CVA denies\par  Lipids at goal on Simvastatin 40mg qhs with LFT's normal\par  denies h/o pancreatitis\par  denies FHx medullary thyroid cancer. \par \par \par

## 2021-03-21 ENCOUNTER — RX RENEWAL (OUTPATIENT)
Age: 76
End: 2021-03-21

## 2021-03-23 ENCOUNTER — RX RENEWAL (OUTPATIENT)
Age: 76
End: 2021-03-23

## 2021-03-23 LAB — GLUCOSE BLDC GLUCOMTR-MCNC: 138

## 2021-04-08 ENCOUNTER — RX RENEWAL (OUTPATIENT)
Age: 76
End: 2021-04-08

## 2021-04-12 ENCOUNTER — RX RENEWAL (OUTPATIENT)
Age: 76
End: 2021-04-12

## 2021-04-20 ENCOUNTER — NON-APPOINTMENT (OUTPATIENT)
Age: 76
End: 2021-04-20

## 2021-04-20 ENCOUNTER — APPOINTMENT (OUTPATIENT)
Dept: INTERNAL MEDICINE | Facility: CLINIC | Age: 76
End: 2021-04-20
Payer: MEDICARE

## 2021-04-20 VITALS
BODY MASS INDEX: 31.15 KG/M2 | SYSTOLIC BLOOD PRESSURE: 116 MMHG | WEIGHT: 230 LBS | HEIGHT: 72 IN | DIASTOLIC BLOOD PRESSURE: 64 MMHG

## 2021-04-20 DIAGNOSIS — M51.9 UNSPECIFIED THORACIC, THORACOLUMBAR AND LUMBOSACRAL INTERVERTEBRAL DISC DISORDER: ICD-10-CM

## 2021-04-20 DIAGNOSIS — Z00.00 ENCOUNTER FOR GENERAL ADULT MEDICAL EXAMINATION W/OUT ABNORMAL FINDINGS: ICD-10-CM

## 2021-04-20 PROCEDURE — 93000 ELECTROCARDIOGRAM COMPLETE: CPT

## 2021-04-20 PROCEDURE — 36415 COLL VENOUS BLD VENIPUNCTURE: CPT

## 2021-04-20 PROCEDURE — G0438: CPT

## 2021-04-20 PROCEDURE — 99072 ADDL SUPL MATRL&STAF TM PHE: CPT

## 2021-04-21 LAB
ALBUMIN SERPL ELPH-MCNC: 4.2 G/DL
ALP BLD-CCNC: 58 U/L
ALT SERPL-CCNC: 6 U/L
ANION GAP SERPL CALC-SCNC: 11 MMOL/L
APPEARANCE: CLEAR
AST SERPL-CCNC: 17 U/L
BASOPHILS # BLD AUTO: 0.02 K/UL
BASOPHILS NFR BLD AUTO: 0.3 %
BILIRUB SERPL-MCNC: 0.5 MG/DL
BILIRUBIN URINE: NEGATIVE
BLOOD URINE: NEGATIVE
BUN SERPL-MCNC: 27 MG/DL
CALCIUM SERPL-MCNC: 9.5 MG/DL
CHLORIDE SERPL-SCNC: 101 MMOL/L
CHOLEST SERPL-MCNC: 136 MG/DL
CO2 SERPL-SCNC: 26 MMOL/L
COLOR: YELLOW
CREAT SERPL-MCNC: 1.33 MG/DL
EOSINOPHIL # BLD AUTO: 0.23 K/UL
EOSINOPHIL NFR BLD AUTO: 3.4 %
ESTIMATED AVERAGE GLUCOSE: 189 MG/DL
GLUCOSE QUALITATIVE U: NEGATIVE
GLUCOSE SERPL-MCNC: 135 MG/DL
HBA1C MFR BLD HPLC: 8.2 %
HCT VFR BLD CALC: 37.7 %
HDLC SERPL-MCNC: 48 MG/DL
HGB BLD-MCNC: 12.2 G/DL
IMM GRANULOCYTES NFR BLD AUTO: 0.1 %
KETONES URINE: NEGATIVE
LDLC SERPL CALC-MCNC: 76 MG/DL
LEUKOCYTE ESTERASE URINE: NEGATIVE
LYMPHOCYTES # BLD AUTO: 1.57 K/UL
LYMPHOCYTES NFR BLD AUTO: 23.3 %
MAN DIFF?: NORMAL
MCHC RBC-ENTMCNC: 30 PG
MCHC RBC-ENTMCNC: 32.4 GM/DL
MCV RBC AUTO: 92.9 FL
MONOCYTES # BLD AUTO: 0.65 K/UL
MONOCYTES NFR BLD AUTO: 9.6 %
NEUTROPHILS # BLD AUTO: 4.27 K/UL
NEUTROPHILS NFR BLD AUTO: 63.3 %
NITRITE URINE: NEGATIVE
NONHDLC SERPL-MCNC: 88 MG/DL
PH URINE: 5.5
PLATELET # BLD AUTO: 184 K/UL
POTASSIUM SERPL-SCNC: 4.4 MMOL/L
PROT SERPL-MCNC: 7.3 G/DL
PROTEIN URINE: NORMAL
PSA FREE FLD-MCNC: 42 %
PSA FREE SERPL-MCNC: 0.64 NG/ML
PSA SERPL-MCNC: 1.52 NG/ML
RBC # BLD: 4.06 M/UL
RBC # FLD: 12.4 %
SODIUM SERPL-SCNC: 138 MMOL/L
SPECIFIC GRAVITY URINE: 1.02
T4 FREE SERPL-MCNC: 1.5 NG/DL
TRIGL SERPL-MCNC: 59 MG/DL
TSH SERPL-ACNC: 1.31 UIU/ML
UROBILINOGEN URINE: NORMAL
WBC # FLD AUTO: 6.75 K/UL

## 2021-04-21 NOTE — HISTORY OF PRESENT ILLNESS
[FreeTextEntry1] : Patient is a 76-year-old male presenting for the first time in a year and a half or so for a physical exam he only major change in his clinical status has been major surgery to his low back. He has significant discogenic and arthritic disease from his cervical spine into his lumbar spine. Subsequent to his surgeries had difficulty voiding. He also has difficulty with pain in his neck. He's lost muscle mass in his hand and shoulder, but is now anticipating doing surgery on his neck at this time. He's had no difficulty with his heart and he sustained a surgery without significant difficulties. His discogenic with his rehabilitation and is continuing his exercises at home. His systems review is otherwise largely negative. All questions were answered.

## 2021-06-01 ENCOUNTER — APPOINTMENT (OUTPATIENT)
Dept: ENDOCRINOLOGY | Facility: CLINIC | Age: 76
End: 2021-06-01

## 2021-11-16 ENCOUNTER — APPOINTMENT (OUTPATIENT)
Dept: FAMILY MEDICINE | Facility: CLINIC | Age: 76
End: 2021-11-16
Payer: MEDICARE

## 2021-11-16 VITALS
WEIGHT: 228 LBS | TEMPERATURE: 95.6 F | HEART RATE: 65 BPM | DIASTOLIC BLOOD PRESSURE: 60 MMHG | SYSTOLIC BLOOD PRESSURE: 124 MMHG | HEIGHT: 72 IN | OXYGEN SATURATION: 99 % | BODY MASS INDEX: 30.88 KG/M2

## 2021-11-16 DIAGNOSIS — Z76.89 PERSONS ENCOUNTERING HEALTH SERVICES IN OTHER SPECIFIED CIRCUMSTANCES: ICD-10-CM

## 2021-11-16 PROCEDURE — 99213 OFFICE O/P EST LOW 20 MIN: CPT

## 2021-11-16 RX ORDER — ASPIRIN 81 MG/1
81 TABLET, DELAYED RELEASE ORAL DAILY
Refills: 0 | Status: COMPLETED | COMMUNITY
End: 2021-10-16

## 2021-11-16 NOTE — HISTORY OF PRESENT ILLNESS
[FreeTextEntry8] : 76 year old male presenting to [facility] care. Patient was Dr. Fernando's patient who retired recently. \par Patient has a past medical history of: \par hyperlipidemia on simvastatin\par controlled HTN on candesartan-HCTZ 32-25 daily, amlodipine 5mg, carvedilol 80mg. needs refill today\par DM on metformin and insulin pump following with Dr. Pollard \par Non proliferative retinopathy follows with Dr. Oviedo and gets injection every 8 weeks. \par Lumbar disc disease s/p surgery on 12/18/20 which improved back pain but continues to have leg pain worse with walking better with exercise. \par He received the flu vaccine on September. \par Lives with his partner for many years and 2 kids that live in [place]. 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

## 2021-12-14 ENCOUNTER — APPOINTMENT (OUTPATIENT)
Dept: ENDOCRINOLOGY | Facility: CLINIC | Age: 76
End: 2021-12-14
Payer: MEDICARE

## 2021-12-14 VITALS
SYSTOLIC BLOOD PRESSURE: 120 MMHG | HEIGHT: 72 IN | BODY MASS INDEX: 32.64 KG/M2 | OXYGEN SATURATION: 98 % | WEIGHT: 241 LBS | HEART RATE: 78 BPM | DIASTOLIC BLOOD PRESSURE: 60 MMHG

## 2021-12-14 LAB
ALBUMIN SERPL ELPH-MCNC: 4.4 G/DL
ALP BLD-CCNC: 56 U/L
ALT SERPL-CCNC: 7 U/L
ANION GAP SERPL CALC-SCNC: 11 MMOL/L
AST SERPL-CCNC: 13 U/L
BASOPHILS # BLD AUTO: 0.02 K/UL
BASOPHILS NFR BLD AUTO: 0.3 %
BILIRUB SERPL-MCNC: 0.6 MG/DL
BUN SERPL-MCNC: 30 MG/DL
CALCIUM SERPL-MCNC: 9.5 MG/DL
CHLORIDE SERPL-SCNC: 101 MMOL/L
CHOLEST SERPL-MCNC: 152 MG/DL
CO2 SERPL-SCNC: 27 MMOL/L
CREAT SERPL-MCNC: 1.56 MG/DL
CREAT SPEC-SCNC: 80 MG/DL
EOSINOPHIL # BLD AUTO: 0.2 K/UL
EOSINOPHIL NFR BLD AUTO: 3.1 %
ESTIMATED AVERAGE GLUCOSE: 217 MG/DL
FRUCTOSAMINE SERPL-MCNC: 480 UMOL/L
GLUCOSE BLDC GLUCOMTR-MCNC: 114
GLUCOSE SERPL-MCNC: 214 MG/DL
HBA1C MFR BLD HPLC: 9.2 %
HCT VFR BLD CALC: 38.9 %
HDLC SERPL-MCNC: 48 MG/DL
HGB BLD-MCNC: 13 G/DL
IMM GRANULOCYTES NFR BLD AUTO: 0.2 %
LDLC SERPL CALC-MCNC: 94 MG/DL
LYMPHOCYTES # BLD AUTO: 1.68 K/UL
LYMPHOCYTES NFR BLD AUTO: 25.7 %
MAN DIFF?: NORMAL
MCHC RBC-ENTMCNC: 31.3 PG
MCHC RBC-ENTMCNC: 33.4 GM/DL
MCV RBC AUTO: 93.5 FL
MICROALBUMIN 24H UR DL<=1MG/L-MCNC: 7.2 MG/DL
MICROALBUMIN/CREAT 24H UR-RTO: 90 MG/G
MONOCYTES # BLD AUTO: 0.61 K/UL
MONOCYTES NFR BLD AUTO: 9.3 %
NEUTROPHILS # BLD AUTO: 4.01 K/UL
NEUTROPHILS NFR BLD AUTO: 61.4 %
NONHDLC SERPL-MCNC: 105 MG/DL
PLATELET # BLD AUTO: 162 K/UL
POTASSIUM SERPL-SCNC: 5 MMOL/L
PROT SERPL-MCNC: 7.4 G/DL
RBC # BLD: 4.16 M/UL
RBC # FLD: 12.3 %
SODIUM SERPL-SCNC: 138 MMOL/L
T4 FREE SERPL-MCNC: 1.5 NG/DL
TRIGL SERPL-MCNC: 55 MG/DL
TSH SERPL-ACNC: 1.46 UIU/ML
WBC # FLD AUTO: 6.53 K/UL

## 2021-12-14 PROCEDURE — 82962 GLUCOSE BLOOD TEST: CPT

## 2021-12-14 PROCEDURE — 95251 CONT GLUC MNTR ANALYSIS I&R: CPT

## 2021-12-14 PROCEDURE — G0447 BEHAVIOR COUNSEL OBESITY 15M: CPT | Mod: 59

## 2021-12-14 PROCEDURE — 99215 OFFICE O/P EST HI 40 MIN: CPT | Mod: 25

## 2021-12-14 RX ORDER — INSULIN LISPRO 100 [IU]/ML
100 INJECTION, SOLUTION SUBCUTANEOUS
Qty: 1 | Refills: 0 | Status: DISCONTINUED | COMMUNITY
Start: 2020-03-11 | End: 2021-12-14

## 2022-01-04 ENCOUNTER — APPOINTMENT (OUTPATIENT)
Dept: BARIATRICS/WEIGHT MGMT | Facility: CLINIC | Age: 77
End: 2022-01-04
Payer: MEDICARE

## 2022-01-04 VITALS — WEIGHT: 230 LBS | BODY MASS INDEX: 30.48 KG/M2 | HEIGHT: 73 IN

## 2022-01-04 PROCEDURE — 97802 MEDICAL NUTRITION INDIV IN: CPT | Mod: 95

## 2022-02-03 ENCOUNTER — APPOINTMENT (OUTPATIENT)
Dept: ENDOCRINOLOGY | Facility: CLINIC | Age: 77
End: 2022-02-03

## 2022-03-01 ENCOUNTER — APPOINTMENT (OUTPATIENT)
Dept: BARIATRICS/WEIGHT MGMT | Facility: CLINIC | Age: 77
End: 2022-03-01

## 2022-04-04 ENCOUNTER — APPOINTMENT (OUTPATIENT)
Dept: ENDOCRINOLOGY | Facility: CLINIC | Age: 77
End: 2022-04-04
Payer: MEDICARE

## 2022-04-04 VITALS
BODY MASS INDEX: 31.28 KG/M2 | WEIGHT: 236 LBS | OXYGEN SATURATION: 98 % | DIASTOLIC BLOOD PRESSURE: 60 MMHG | HEART RATE: 78 BPM | HEIGHT: 73 IN | SYSTOLIC BLOOD PRESSURE: 130 MMHG

## 2022-04-04 LAB
ALBUMIN SERPL ELPH-MCNC: 4.3 G/DL
ALP BLD-CCNC: 66 U/L
ALT SERPL-CCNC: 6 U/L
ANION GAP SERPL CALC-SCNC: 12 MMOL/L
AST SERPL-CCNC: 13 U/L
BILIRUB SERPL-MCNC: 0.6 MG/DL
BUN SERPL-MCNC: 33 MG/DL
CALCIUM SERPL-MCNC: 9.5 MG/DL
CHLORIDE SERPL-SCNC: 103 MMOL/L
CHOLEST SERPL-MCNC: 164 MG/DL
CO2 SERPL-SCNC: 27 MMOL/L
CREAT SERPL-MCNC: 1.45 MG/DL
CREAT SPEC-SCNC: 67 MG/DL
EGFR: 49 ML/MIN/1.73M2
ESTIMATED AVERAGE GLUCOSE: 209 MG/DL
GLUCOSE BLDC GLUCOMTR-MCNC: 230
GLUCOSE SERPL-MCNC: 255 MG/DL
HBA1C MFR BLD HPLC: 8.9 %
HDLC SERPL-MCNC: 48 MG/DL
LDLC SERPL CALC-MCNC: 101 MG/DL
MICROALBUMIN 24H UR DL<=1MG/L-MCNC: 7.8 MG/DL
MICROALBUMIN/CREAT 24H UR-RTO: 116 MG/G
NONHDLC SERPL-MCNC: 115 MG/DL
POTASSIUM SERPL-SCNC: 4.8 MMOL/L
PROT SERPL-MCNC: 7.2 G/DL
SODIUM SERPL-SCNC: 142 MMOL/L
TRIGL SERPL-MCNC: 74 MG/DL

## 2022-04-04 PROCEDURE — 95251 CONT GLUC MNTR ANALYSIS I&R: CPT

## 2022-04-04 PROCEDURE — 82962 GLUCOSE BLOOD TEST: CPT

## 2022-04-04 PROCEDURE — 99215 OFFICE O/P EST HI 40 MIN: CPT | Mod: 25

## 2022-04-04 NOTE — REASON FOR VISIT
[FreeTextEntry1] : Patient came in for Omnipod pump report download. \par Pump report download completed, given to  to scan into patient's EMR. \par Paper copy provided to physician for review as requested. \par \par Patient's new pump setting provided to patient.\par Please view scanned document from today for physician's changes. [Follow - Up] : a follow-up visit [DM Type 2] : DM Type 2

## 2022-04-05 ENCOUNTER — RX RENEWAL (OUTPATIENT)
Age: 77
End: 2022-04-05

## 2022-04-14 ENCOUNTER — RX RENEWAL (OUTPATIENT)
Age: 77
End: 2022-04-14

## 2022-05-24 ENCOUNTER — APPOINTMENT (OUTPATIENT)
Dept: ENDOCRINOLOGY | Facility: CLINIC | Age: 77
End: 2022-05-24
Payer: MEDICARE

## 2022-05-24 VITALS
HEART RATE: 68 BPM | SYSTOLIC BLOOD PRESSURE: 120 MMHG | WEIGHT: 232 LBS | OXYGEN SATURATION: 98 % | DIASTOLIC BLOOD PRESSURE: 60 MMHG | BODY MASS INDEX: 30.75 KG/M2 | HEIGHT: 73 IN

## 2022-05-24 LAB — GLUCOSE BLDC GLUCOMTR-MCNC: 115

## 2022-05-24 PROCEDURE — 99215 OFFICE O/P EST HI 40 MIN: CPT | Mod: 25

## 2022-05-24 PROCEDURE — 95251 CONT GLUC MNTR ANALYSIS I&R: CPT

## 2022-05-24 PROCEDURE — 82962 GLUCOSE BLOOD TEST: CPT

## 2022-06-01 ENCOUNTER — APPOINTMENT (OUTPATIENT)
Dept: ENDOCRINOLOGY | Facility: CLINIC | Age: 77
End: 2022-06-01

## 2022-06-11 NOTE — HISTORY OF PRESENT ILLNESS
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, too variable BS without a trend , has a busy schedule , had back surgery 12/18/2020  Dr karimi [facility] now doing PT recovering slowlly\par \par had  physically active at work only , eating bed diet too\par does bolusing without getting the sugar in the bolus wizard "I am too busy and my hands are dirty" \par does boluses with his OMNIPOD without checking sugar values, variable -300\par has DEXCOM from 11/2019 never received the training now on freestyle adenike not accurate \par had a pump failure episode, received a new one where his basal rates are slightly higher , however his sugars are still high\par per pt he does not have any more the settings for small, medium and high meal, also for the snack\par the old pod closed so we were not able to review except the large meal, I added the rest per pt memory \par I advised the pt to double check when he goes home\par \par does have days with no BS checked, [place] the city [place] BS was 300 when he came back\par from insulin pump download BS 25? after dinner per tp he was never that low, feels low with BS 80s\par has Dexcom at home, never got the training \par  coming for a sooner f/u as he started Omnipod insulin pump , likes it very much thankful\par insulin pump downloaded and reviewed with the pt, copy scanned in the chart \par  has bedtime snacks with no bolusing , also not good with counting CHO does aproximation "big meal 75g CHO, small meal snack et"\par  saw optho [facility] Dr. O[place]\par  9/18 reviewed \par  Severe Nonproliferative Diabetes in OD/OS stable. \par  Cystoid macular edema stable. \par  does not want free style adenike as he is too active , however willing to try dexcom \par  type 2 DM for almost 30 years "early 90's " first few years on Metformin only, complicated by PDR and neuropathy \par  Humalog Subcutaneous, used to take before breakfast 15-20units..........35-45 some times 50 units for supper\par  last Hga1c 8.7 on 7/20/18, 8.6 on 10/18\par  labs reviewed\par  tried Victoza made him sick \par  Toujeo 30units qhs changed few days ago see phone notes\par  Metformin ER 500mg 2tab bid \par  stopped Trulicity 1.5mg once a week for 2 weeks dose increased, has had some GERD\par  \par  \par  Microvascular complications: \par  Nephropathy denies normal Cr 3/16\par  Neuropathy symptoms, denies seen Podiatry long time ago was a waste of time per pt \par  Retinopathy yes s/p lasser tx and eye shots sees Retina specialist Dr [facility] \par  Macrovascular complications: \par  HTN on Atacand-HCTZ, Amlodipine, Coreg CR high today repeated better\par  CAD/CVA denies\par  Lipids at goal on Simvastatin 40mg qhs with LFT's normal\par  denies h/o pancreatitis\par  denies FHx medullary thyroid cancer. \par \par \par

## 2022-06-24 ENCOUNTER — RX RENEWAL (OUTPATIENT)
Age: 77
End: 2022-06-24

## 2022-06-27 ENCOUNTER — RX RENEWAL (OUTPATIENT)
Age: 77
End: 2022-06-27

## 2022-07-07 RX ORDER — METFORMIN HYDROCHLORIDE 1000 MG/1
1000 TABLET, COATED ORAL
Qty: 180 | Refills: 1 | Status: DISCONTINUED | COMMUNITY
Start: 2020-06-16 | End: 2022-07-07

## 2022-07-22 ENCOUNTER — RX RENEWAL (OUTPATIENT)
Age: 77
End: 2022-07-22

## 2022-08-24 ENCOUNTER — APPOINTMENT (OUTPATIENT)
Dept: ENDOCRINOLOGY | Facility: CLINIC | Age: 77
End: 2022-08-24

## 2022-08-24 VITALS
HEIGHT: 73 IN | HEART RATE: 64 BPM | BODY MASS INDEX: 31.28 KG/M2 | WEIGHT: 236 LBS | DIASTOLIC BLOOD PRESSURE: 60 MMHG | SYSTOLIC BLOOD PRESSURE: 122 MMHG | OXYGEN SATURATION: 98 %

## 2022-08-24 LAB
ALBUMIN SERPL ELPH-MCNC: 4.3 G/DL
ALP BLD-CCNC: 54 U/L
ALT SERPL-CCNC: 6 U/L
ANION GAP SERPL CALC-SCNC: 10 MMOL/L
AST SERPL-CCNC: 16 U/L
BILIRUB SERPL-MCNC: 0.6 MG/DL
BUN SERPL-MCNC: 32 MG/DL
CALCIUM SERPL-MCNC: 9.5 MG/DL
CHLORIDE SERPL-SCNC: 104 MMOL/L
CHOLEST SERPL-MCNC: 140 MG/DL
CO2 SERPL-SCNC: 29 MMOL/L
CREAT SERPL-MCNC: 1.84 MG/DL
CREAT SPEC-SCNC: 84 MG/DL
EGFR: 37 ML/MIN/1.73M2
ESTIMATED AVERAGE GLUCOSE: 160 MG/DL
GLUCOSE BLDC GLUCOMTR-MCNC: 116
GLUCOSE SERPL-MCNC: 143 MG/DL
HBA1C MFR BLD HPLC: 7.2 %
HDLC SERPL-MCNC: 42 MG/DL
LDLC SERPL CALC-MCNC: 86 MG/DL
MICROALBUMIN 24H UR DL<=1MG/L-MCNC: 1.4 MG/DL
MICROALBUMIN/CREAT 24H UR-RTO: 17 MG/G
NONHDLC SERPL-MCNC: 98 MG/DL
POTASSIUM SERPL-SCNC: 5 MMOL/L
PROT SERPL-MCNC: 6.8 G/DL
SODIUM SERPL-SCNC: 144 MMOL/L
T4 FREE SERPL-MCNC: 1.3 NG/DL
TRIGL SERPL-MCNC: 57 MG/DL
TSH SERPL-ACNC: 0.95 UIU/ML

## 2022-08-24 PROCEDURE — 99215 OFFICE O/P EST HI 40 MIN: CPT | Mod: 25

## 2022-08-24 PROCEDURE — 82962 GLUCOSE BLOOD TEST: CPT

## 2022-08-29 NOTE — PHYSICAL EXAM
[Alert] : alert [Healthy Appearance] : healthy appearance [Obese] : obese [Normal Sclera/Conjunctiva] : normal sclera/conjunctiva [No Respiratory Distress] : no respiratory distress [No Accessory Muscle Use] : no accessory muscle use [No Stigmata of Cushings Syndrome] : no stigmata of Cushings Syndrome [Normal Gait] : normal gait [No Involuntary Movements] : no involuntary movements were seen [No Joint Swelling] : no joint swelling seen [Normal Strength/Tone] : muscle strength and tone were normal [Cranial Nerves Intact] : cranial nerves 2-12 were intact [Oriented x3] : oriented to person, place, and time [Kyphosis] : no kyphosis present [Scoliosis] : no scoliosis [Foot Ulcers] : no foot ulcers [Acne] : no acne [Hirsutism] : no hirsutism

## 2022-08-29 NOTE — HISTORY OF PRESENT ILLNESS
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, too variable BS without a trend , has a busy schedule , had back surgery 12/18/2020  Dr karimi [facility] now doing PT recovering slowlly\par \par had  physically active at work only , eating bed diet too\par does bolusing without getting the sugar in the bolus wizard "I am too busy and my hands are dirty" \par does boluses with his OMNIPOD without checking sugar values, variable -300\par has DEXCOM from 11/2019 never received the training now on freestyle adenike not accurate \par had a pump failure episode, received a new one where his basal rates are slightly higher , however his sugars are still high\par per pt he does not have any more the settings for small, medium and high meal, also for the snack\par the old pod closed so we were not able to review except the large meal, I added the rest per pt memory \par I advised the pt to double check when he goes home\par \par does have days with no BS checked, [place] the [place] BS was 300 when he came back\par from insulin pump download BS 25? after dinner per tp he was never that low, feels low with BS 80s\par has Dexcom at home, never got the training \par  coming for a sooner f/u as he started Omnipod insulin pump , likes it very much thankful\par insulin pump downloaded and reviewed with the pt, copy scanned in the chart \par  has bedtime snacks with no bolusing , also not good with counting CHO does aproximation "big meal 75g CHO, small meal snack et"\par  saw optho [facility] Dr. O[place]\par  9/18 reviewed \par  Severe Nonproliferative Diabetes in OD/OS stable. \par  Cystoid macular edema stable. \par  does not want free style adenike as he is too active , however willing to try dexcom \par  type 2 DM for almost 30 years "early 90's " first few years on Metformin only, complicated by PDR and neuropathy \par  Humalog Subcutaneous, used to take before breakfast 15-20units..........35-45 some times 50 units for supper\par  last Hga1c 8.7 on 7/20/18, 8.6 on 10/18\par  labs reviewed\par  tried Victoza made him sick \par  Toujeo 30units qhs changed few days ago see phone notes\par  Metformin ER 500mg 2tab bid \par  stopped Trulicity 1.5mg once a week for 2 weeks dose increased, has had some GERD\par  \par  \par  Microvascular complications: \par  Nephropathy denies normal Cr 3/16\par  Neuropathy symptoms, denies seen Podiatry long time ago was a waste of time per pt \par  Retinopathy yes s/p lasser tx and eye shots sees Retina specialist Dr [facility] \par  Macrovascular complications: \par  HTN on Atacand-HCTZ, Amlodipine, Coreg CR high today repeated better\par  CAD/CVA denies\par  Lipids at goal on Simvastatin 40mg qhs with LFT's normal\par  denies h/o pancreatitis\par  denies FHx medullary thyroid cancer. \par \par \par

## 2022-09-14 ENCOUNTER — APPOINTMENT (OUTPATIENT)
Dept: ENDOCRINOLOGY | Facility: CLINIC | Age: 77
End: 2022-09-14

## 2022-09-14 ENCOUNTER — RESULT REVIEW (OUTPATIENT)
Age: 77
End: 2022-09-14

## 2022-09-14 ENCOUNTER — APPOINTMENT (OUTPATIENT)
Dept: NEPHROLOGY | Facility: CLINIC | Age: 77
End: 2022-09-14

## 2022-09-14 VITALS
OXYGEN SATURATION: 97 % | HEIGHT: 73 IN | TEMPERATURE: 96.5 F | BODY MASS INDEX: 30.48 KG/M2 | SYSTOLIC BLOOD PRESSURE: 100 MMHG | DIASTOLIC BLOOD PRESSURE: 50 MMHG | HEART RATE: 64 BPM | WEIGHT: 230 LBS

## 2022-09-14 DIAGNOSIS — Z79.1 LONG TERM (CURRENT) USE OF NON-STEROIDAL ANTI-INFLAMMATORIES (NSAID): ICD-10-CM

## 2022-09-14 PROCEDURE — 99205 OFFICE O/P NEW HI 60 MIN: CPT

## 2022-09-14 PROCEDURE — 95251 CONT GLUC MNTR ANALYSIS I&R: CPT

## 2022-09-14 NOTE — ASSESSMENT
[FreeTextEntry1] : CKD 3 2/2 DM, HTN +/- NSAIDs\par - BP well controlled\par - DM improving\par - appropriately on SGLT2\par - counseled on refraining from NSAIDs\par - check BMET, UPC, US kidney/bladder

## 2022-09-14 NOTE — PHYSICAL EXAM
[General Appearance - Alert] : alert [General Appearance - In No Acute Distress] : in no acute distress [Sclera] : the sclera and conjunctiva were normal [PERRL With Normal Accommodation] : pupils were equal in size, round, and reactive to light [Extraocular Movements] : extraocular movements were intact [Outer Ear] : the ears and nose were normal in appearance [Oropharynx] : the oropharynx was normal [Neck Appearance] : the appearance of the neck was normal [Neck Cervical Mass (___cm)] : no neck mass was observed [Jugular Venous Distention Increased] : there was no jugular-venous distention [Thyroid Diffuse Enlargement] : the thyroid was not enlarged [Thyroid Nodule] : there were no palpable thyroid nodules [Auscultation Breath Sounds / Voice Sounds] : lungs were clear to auscultation bilaterally [Heart Rate And Rhythm] : heart rate was normal and rhythm regular [Heart Sounds] : normal S1 and S2 [Heart Sounds Gallop] : no gallops [Murmurs] : no murmurs [Heart Sounds Pericardial Friction Rub] : no pericardial rub [Breast Palpation Mass] : no palpable masses [Bowel Sounds] : normal bowel sounds [Abdomen Soft] : soft [Abdomen Tenderness] : non-tender [] : no hepato-splenomegaly [Abdomen Mass (___ Cm)] : no abdominal mass palpated [Abnormal Walk] : normal gait [Nail Clubbing] : no clubbing  or cyanosis of the fingernails [Musculoskeletal - Swelling] : no joint swelling seen [Motor Tone] : muscle strength and tone were normal [Skin Color & Pigmentation] : normal skin color and pigmentation [Cranial Nerves] : cranial nerves 2-12 were intact [Oriented To Time, Place, And Person] : oriented to person, place, and time

## 2022-09-14 NOTE — HISTORY OF PRESENT ILLNESS
[FreeTextEntry1] : Pleasant male woth longstanding DM, HTN, weekly us eof NSAIDs noted to have gradual decline in renal function since 2020, cr 1.2, gfr gradually declined since and most recently is 1.8 as of 8/2022 ( of note was started on SGLT2 in 4/2022)\par \par Denies Etoh or smoking\par Occ: Body shop\par

## 2022-09-15 ENCOUNTER — NON-APPOINTMENT (OUTPATIENT)
Age: 77
End: 2022-09-15

## 2022-09-20 ENCOUNTER — RESULT REVIEW (OUTPATIENT)
Age: 77
End: 2022-09-20

## 2022-09-29 ENCOUNTER — RESULT REVIEW (OUTPATIENT)
Age: 77
End: 2022-09-29

## 2022-09-29 ENCOUNTER — APPOINTMENT (OUTPATIENT)
Dept: NEPHROLOGY | Facility: CLINIC | Age: 77
End: 2022-09-29

## 2022-09-29 VITALS
OXYGEN SATURATION: 96 % | SYSTOLIC BLOOD PRESSURE: 120 MMHG | HEIGHT: 73 IN | BODY MASS INDEX: 30.48 KG/M2 | WEIGHT: 230 LBS | HEART RATE: 68 BPM | TEMPERATURE: 97.7 F | DIASTOLIC BLOOD PRESSURE: 60 MMHG

## 2022-09-29 PROCEDURE — 99213 OFFICE O/P EST LOW 20 MIN: CPT

## 2022-09-29 NOTE — ASSESSMENT
[FreeTextEntry1] : CKD 3 2/2 DM, HTN +/- NSAIDs\par - BP well controlled\par - DM improving\par - appropriately on SGLT2\par - counseled on refraining from NSAIDs\par - check BMET, UPC, US kidney/bladder reviewed\par - recheck BMET,

## 2022-09-29 NOTE — HISTORY OF PRESENT ILLNESS
[FreeTextEntry1] : Pleasant male woth longstanding DM, HTN, weekly use of NSAIDs noted to have gradual decline in renal function since 2020, cr 1.2, gfr gradually declined since and most recently is 1.8 as of 8/2022 ( of note was started on SGLT2 in 4/2022)\par \par Denies Etoh or smoking\par Occ: Body shop\par NKDA\par \par here for f/u 9/2022\par - appropriately on SGLT 2, rise in cr seems to correlate with starting jardiacne - has plateaued\par - BP well controlled\par - feels well

## 2022-10-12 ENCOUNTER — APPOINTMENT (OUTPATIENT)
Dept: NEPHROLOGY | Facility: CLINIC | Age: 77
End: 2022-10-12

## 2022-10-12 ENCOUNTER — RESULT REVIEW (OUTPATIENT)
Age: 77
End: 2022-10-12

## 2022-10-12 PROCEDURE — 36415 COLL VENOUS BLD VENIPUNCTURE: CPT

## 2022-10-18 ENCOUNTER — RX RENEWAL (OUTPATIENT)
Age: 77
End: 2022-10-18

## 2022-11-07 ENCOUNTER — APPOINTMENT (OUTPATIENT)
Dept: ENDOCRINOLOGY | Facility: CLINIC | Age: 77
End: 2022-11-07

## 2022-11-07 VITALS
BODY MASS INDEX: 31.54 KG/M2 | WEIGHT: 238 LBS | SYSTOLIC BLOOD PRESSURE: 122 MMHG | OXYGEN SATURATION: 97 % | DIASTOLIC BLOOD PRESSURE: 80 MMHG | HEART RATE: 56 BPM | HEIGHT: 73 IN

## 2022-11-07 LAB
ALBUMIN SERPL ELPH-MCNC: 4.1 G/DL
ALP BLD-CCNC: 57 U/L
ALT SERPL-CCNC: <5 U/L
ANION GAP SERPL CALC-SCNC: 13 MMOL/L
AST SERPL-CCNC: 11 U/L
BILIRUB SERPL-MCNC: 0.7 MG/DL
BUN SERPL-MCNC: 33 MG/DL
CALCIUM SERPL-MCNC: 9.2 MG/DL
CHLORIDE SERPL-SCNC: 104 MMOL/L
CHOLEST SERPL-MCNC: 219 MG/DL
CO2 SERPL-SCNC: 25 MMOL/L
CREAT SERPL-MCNC: 1.6 MG/DL
CREAT SPEC-SCNC: 75 MG/DL
EGFR: 44 ML/MIN/1.73M2
ESTIMATED AVERAGE GLUCOSE: 163 MG/DL
GLUCOSE BLDC GLUCOMTR-MCNC: 105
GLUCOSE SERPL-MCNC: 153 MG/DL
HBA1C MFR BLD HPLC: 7.3 %
HDLC SERPL-MCNC: 50 MG/DL
LDLC SERPL CALC-MCNC: 157 MG/DL
MICROALBUMIN 24H UR DL<=1MG/L-MCNC: 1.3 MG/DL
MICROALBUMIN/CREAT 24H UR-RTO: 17 MG/G
NONHDLC SERPL-MCNC: 169 MG/DL
POTASSIUM SERPL-SCNC: 4.4 MMOL/L
PROT SERPL-MCNC: 7.1 G/DL
SODIUM SERPL-SCNC: 143 MMOL/L
TRIGL SERPL-MCNC: 60 MG/DL

## 2022-11-07 PROCEDURE — 95251 CONT GLUC MNTR ANALYSIS I&R: CPT

## 2022-11-07 PROCEDURE — G0447 BEHAVIOR COUNSEL OBESITY 15M: CPT | Mod: 59

## 2022-11-07 PROCEDURE — 99215 OFFICE O/P EST HI 40 MIN: CPT | Mod: 25

## 2022-11-07 PROCEDURE — 82962 GLUCOSE BLOOD TEST: CPT

## 2022-11-13 NOTE — ASSESSMENT
[FreeTextEntry1] : will contact CDE from [employer] to help with his new Omnipod dash, old one has battery issues per pt

## 2022-11-13 NOTE — HISTORY OF PRESENT ILLNESS
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, too variable BS without a trend , has a busy schedule , had back surgery 12/18/2020  Dr karimi [facility] now doing PT recovering slowlly\par \par had  physically active at work only , eating bed diet too\par does bolusing without getting the sugar in the bolus wizard "I am too busy and my hands are dirty" \par does boluses with his OMNIPOD without checking sugar values, variable -300\par has DEXCOM from 11/2019 never received the training now on freestyle adenike not accurate \par had a pump failure episode, received a new one where his basal rates are slightly higher , however his sugars are still high\par per pt he does not have any more the settings for small, medium and high meal, also for the snack\par the old pod closed so we were not able to review except the large meal, I added the rest per pt memory \par I advised the pt to double check when he goes home\par \par does have days with no BS checked, [place] BS was 300 when he came back\par from insulin pump download BS 25? after dinner per tp he was never that low, feels low with BS 80s\par has Dexcom at home, never got the training \par  coming for a sooner f/u as he started Omnipod insulin pump , likes it very much thankful\par insulin pump downloaded and reviewed with the pt, copy scanned in the chart \par  has bedtime snacks with no bolusing , also not good with counting CHO does aproximation "big meal 75g CHO, small meal snack et"\par  saw optho [facility] Dr. [place]\par  9/18 reviewed \par  Severe Nonproliferative Diabetes in OD/OS stable. \par  Cystoid macular edema stable. \par  does not want free style adenike as he is too active , however willing to try dexcom \par  type 2 DM for almost 30 years "early 90's " first few years on Metformin only, complicated by PDR and neuropathy \par  Humalog Subcutaneous, used to take before breakfast 15-20units..........35-45 some times 50 units for supper\par  last Hga1c 8.7 on 7/20/18, 8.6 on 10/18\par  labs reviewed\par  tried Victoza made him sick \par  Toujeo 30units qhs changed few days ago see phone notes\par  Metformin ER 500mg 2tab bid \par  stopped Trulicity 1.5mg once a week for 2 weeks dose increased, has had some GERD\par  \par  \par  Microvascular complications: \par  Nephropathy denies normal Cr 3/16\par  Neuropathy symptoms, denies seen Podiatry long time ago was a waste of time per pt \par  Retinopathy yes s/p lasser tx and eye shots sees Retina specialist Dr [facility] \par  Macrovascular complications: \par  HTN on Atacand-HCTZ, Amlodipine, Coreg CR high today repeated better\par  CAD/CVA denies\par  Lipids at goal on Simvastatin 40mg qhs with LFT's normal\par  denies h/o pancreatitis\par  denies FHx medullary thyroid cancer. \par \par \par  [Hypoglycemia] : Patient is not hypoglycemic.

## 2022-12-28 ENCOUNTER — RX RENEWAL (OUTPATIENT)
Age: 77
End: 2022-12-28

## 2022-12-28 RX ORDER — INSULIN LISPRO 100 [IU]/ML
100 INJECTION, SOLUTION INTRAVENOUS; SUBCUTANEOUS
Qty: 7 | Refills: 1 | Status: DISCONTINUED | COMMUNITY
Start: 2020-02-25 | End: 2022-12-28

## 2023-01-23 ENCOUNTER — APPOINTMENT (OUTPATIENT)
Dept: FAMILY MEDICINE | Facility: CLINIC | Age: 78
End: 2023-01-23
Payer: MEDICARE

## 2023-01-23 VITALS
TEMPERATURE: 97 F | WEIGHT: 235 LBS | HEART RATE: 70 BPM | HEIGHT: 73 IN | BODY MASS INDEX: 31.14 KG/M2 | DIASTOLIC BLOOD PRESSURE: 80 MMHG | OXYGEN SATURATION: 97 % | SYSTOLIC BLOOD PRESSURE: 130 MMHG

## 2023-01-23 PROCEDURE — 99213 OFFICE O/P EST LOW 20 MIN: CPT

## 2023-01-26 ENCOUNTER — RESULT REVIEW (OUTPATIENT)
Age: 78
End: 2023-01-26

## 2023-01-26 ENCOUNTER — APPOINTMENT (OUTPATIENT)
Dept: NEPHROLOGY | Facility: CLINIC | Age: 78
End: 2023-01-26
Payer: MEDICARE

## 2023-01-26 VITALS
BODY MASS INDEX: 31.14 KG/M2 | SYSTOLIC BLOOD PRESSURE: 130 MMHG | TEMPERATURE: 97 F | WEIGHT: 235 LBS | DIASTOLIC BLOOD PRESSURE: 70 MMHG | HEIGHT: 73 IN | OXYGEN SATURATION: 99 % | HEART RATE: 87 BPM

## 2023-01-26 PROCEDURE — 99214 OFFICE O/P EST MOD 30 MIN: CPT

## 2023-01-26 RX ORDER — AMLODIPINE BESYLATE 5 MG/1
5 TABLET ORAL
Qty: 90 | Refills: 2 | Status: DISCONTINUED | COMMUNITY
Start: 2019-08-26 | End: 2023-01-26

## 2023-01-26 NOTE — ASSESSMENT
[FreeTextEntry1] : CKD 3 2/2 DM, HTN +/- NSAIDs\par - BP well controlled, now only on arb/HCTZ ( off amlodipine and metoprolol)\par - DM improving since starting SGLT2\par - counseled on refraining from NSAIDs\par - check BMET, UPC, US kidney/bladder reviewed\par - reviewed a1c, bun, cr, k, hgb, lfts\par - reviewed endo, neph, PCP notes reviewed

## 2023-01-26 NOTE — HISTORY OF PRESENT ILLNESS
[FreeTextEntry1] : Pleasant male woth longstanding DM, HTN, weekly use of NSAIDs noted to have gradual decline in renal function since 2020, cr 1.2, gfr gradually declined since and most recently is 1.8 as of 8/2022 ( of note was started on SGLT2 in 4/2022)\par \par Denies Etoh or smoking\par Occ: Body shop\par NKDA\par \par here for f/u 9/2022\par - appropriately on SGLT 2, rise in cr seems to correlate with starting jardiance - has plateaued\par - BP well controlled\par - feels well\par \par here for f/u 1/2023\par - stopped amlodipine and metoprolol due to low BP - feels fine, no more episodes of lightheadedness\par - reduced metformin "tightness" in calves improved\par - feels much better now\par

## 2023-01-26 NOTE — PHYSICAL EXAM
[General Appearance - Alert] : alert [General Appearance - In No Acute Distress] : in no acute distress [Sclera] : the sclera and conjunctiva were normal [PERRL With Normal Accommodation] : pupils were equal in size, round, and reactive to light [Extraocular Movements] : extraocular movements were intact [Outer Ear] : the ears and nose were normal in appearance [Oropharynx] : the oropharynx was normal [Neck Appearance] : the appearance of the neck was normal [Neck Cervical Mass (___cm)] : no neck mass was observed [Jugular Venous Distention Increased] : there was no jugular-venous distention [Thyroid Diffuse Enlargement] : the thyroid was not enlarged [Thyroid Nodule] : there were no palpable thyroid nodules [Auscultation Breath Sounds / Voice Sounds] : lungs were clear to auscultation bilaterally [Heart Rate And Rhythm] : heart rate was normal and rhythm regular [Heart Sounds] : normal S1 and S2 [Heart Sounds Gallop] : no gallops [Murmurs] : no murmurs [Heart Sounds Pericardial Friction Rub] : no pericardial rub [Edema] : there was no peripheral edema [Veins - Varicosity Changes] : there were no varicosital changes [Breast Palpation Mass] : no palpable masses [Bowel Sounds] : normal bowel sounds [Abdomen Soft] : soft [Abdomen Tenderness] : non-tender [] : no hepato-splenomegaly [Abdomen Mass (___ Cm)] : no abdominal mass palpated [Abnormal Walk] : normal gait [Nail Clubbing] : no clubbing  or cyanosis of the fingernails [Musculoskeletal - Swelling] : no joint swelling seen [Motor Tone] : muscle strength and tone were normal [Skin Color & Pigmentation] : normal skin color and pigmentation [Cranial Nerves] : cranial nerves 2-12 were intact [Oriented To Time, Place, And Person] : oriented to person, place, and time

## 2023-01-27 NOTE — HISTORY OF PRESENT ILLNESS
[FreeTextEntry1] : Follow up  [de-identified] : 77 year old male with pmh of HTN, HLD, DM presenting to discuss his medication. Patient reports for a month he was weak and noticed trouble seeing. He had been checking his glucose which were normal. His blood pressure was running low in the 100/50s. He discontinued amlodipine and carvedilol 2 weeks ago and noticed a significant improvement in his symptoms. His blood pressure at home has now been running in the 104-110/67-70s.

## 2023-02-06 ENCOUNTER — APPOINTMENT (OUTPATIENT)
Dept: ENDOCRINOLOGY | Facility: CLINIC | Age: 78
End: 2023-02-06
Payer: MEDICARE

## 2023-02-06 VITALS
HEIGHT: 73 IN | DIASTOLIC BLOOD PRESSURE: 80 MMHG | SYSTOLIC BLOOD PRESSURE: 122 MMHG | OXYGEN SATURATION: 98 % | HEART RATE: 80 BPM | WEIGHT: 233 LBS | BODY MASS INDEX: 30.88 KG/M2

## 2023-02-06 LAB — GLUCOSE BLDC GLUCOMTR-MCNC: 196

## 2023-02-06 PROCEDURE — 99215 OFFICE O/P EST HI 40 MIN: CPT | Mod: 25

## 2023-02-06 PROCEDURE — G0447 BEHAVIOR COUNSEL OBESITY 15M: CPT | Mod: 59

## 2023-02-06 PROCEDURE — 82962 GLUCOSE BLOOD TEST: CPT

## 2023-02-06 RX ORDER — FLASH GLUCOSE SENSOR
KIT MISCELLANEOUS
Qty: 6 | Refills: 3 | Status: DISCONTINUED | COMMUNITY
Start: 2020-08-20 | End: 2023-02-06

## 2023-02-06 NOTE — ASSESSMENT
[0] : 2) Feeling down, depressed, or hopeless: Not at all (0) [PHQ-2 Negative - No further assessment needed] : PHQ-2 Negative - No further assessment needed [FreeTextEntry1] : will contact CDE from [employer] to help with his new Omnipod dash, old one has battery issues per pt  [[id number]] : 0

## 2023-02-06 NOTE — HISTORY OF PRESENT ILLNESS
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, coming for f/u\par - BP well controlled, now only on arb/HCTZ ( off amlodipine and metoprolol)\par he believes his leg weakness is beter since Metformin was decreased, asking to stop Jardiance \par \par had  physically active at work only , eating bed diet too\par does bolusing without getting the sugar in the bolus wizard "I am too busy and my hands are dirty" \par does boluses with his OMNIPOD without checking sugar values, variable -300\par has DEXCOM from 11/2019 never received the training now on freestyle adenike not accurate \par had a pump failure episode, received a new one where his basal rates are slightly higher , however his sugars are still high\par per pt he does not have any more the settings for small, medium and high meal, also for the snack\par the old pod closed so we were not able to review except the large meal, I added the rest per pt memory \par I advised the pt to double check when he goes home\par \par does have days with no BS checked, [place] the city [place] BS was 300 when he came back\par from insulin pump download BS 25? after dinner per tp he was never that low, feels low with BS 80s\par has Dexcom at home, never got the training \par  coming for a sooner f/u as he started Omnipod insulin pump , likes it very much thankful\par insulin pump downloaded and reviewed with the pt, copy scanned in the chart \par  has bedtime snacks with no bolusing , also not good with counting CHO does aproximation "big meal 75g CHO, small meal snack et"\par  saw optho [facility] Dr. [place]\par  9/18 reviewed \par  Severe Nonproliferative Diabetes in OD/OS stable. \par  Cystoid macular edema stable. \par  does not want free style adenike as he is too active , however willing to try dexcom \par  type 2 DM for almost 30 years "early 90's " first few years on Metformin only, complicated by PDR and neuropathy \par  Humalog Subcutaneous, used to take before breakfast 15-20units..........35-45 some times 50 units for supper\par  last Hga1c 8.7 on 7/20/18, 8.6 on 10/18\par  labs reviewed\par  tried Victoza made him sick \par  Toujeo 30units qhs changed few days ago see phone notes\par  Metformin ER 500mg 2tab bid \par  stopped Trulicity 1.5mg once a week for 2 weeks dose increased, has had some GERD\par  \par  \par  Microvascular complications: \par  Nephropathy denies normal Cr 3/16\par  Neuropathy symptoms, denies seen Podiatry long time ago was a waste of time per pt \par  Retinopathy yes s/p lasser tx and eye shots sees Retina specialist Dr [facility] \par  Macrovascular complications: \par  HTN on Atacand-HCTZ, Amlodipine, Coreg CR high today repeated better\par  CAD/CVA denies\par  Lipids at goal on Simvastatin 40mg qhs with LFT's normal\par  denies h/o pancreatitis\par  denies FHx medullary thyroid cancer. \par \par \par  [Hypoglycemia] : Patient is not hypoglycemic.

## 2023-02-22 ENCOUNTER — RX RENEWAL (OUTPATIENT)
Age: 78
End: 2023-02-22

## 2023-03-06 ENCOUNTER — RX RENEWAL (OUTPATIENT)
Age: 78
End: 2023-03-06

## 2023-03-07 ENCOUNTER — RESULT REVIEW (OUTPATIENT)
Age: 78
End: 2023-03-07

## 2023-03-07 ENCOUNTER — APPOINTMENT (OUTPATIENT)
Dept: NEPHROLOGY | Facility: CLINIC | Age: 78
End: 2023-03-07
Payer: MEDICARE

## 2023-03-07 VITALS
SYSTOLIC BLOOD PRESSURE: 122 MMHG | RESPIRATION RATE: 18 BRPM | HEART RATE: 90 BPM | DIASTOLIC BLOOD PRESSURE: 68 MMHG | OXYGEN SATURATION: 99 %

## 2023-03-07 PROCEDURE — 36415 COLL VENOUS BLD VENIPUNCTURE: CPT

## 2023-04-13 NOTE — REVIEW OF SYSTEMS
----- Message from Randy Burleson MA sent at 4/13/2023 10:59 AM CDT -----  Contact: [phone]  Mom called              Mom would like copy of [facility] shot records and to be picked up.            Call back  [phone]     [Negative] : Heme/Lymph

## 2023-04-28 ENCOUNTER — RX RENEWAL (OUTPATIENT)
Age: 78
End: 2023-04-28

## 2023-05-04 ENCOUNTER — APPOINTMENT (OUTPATIENT)
Dept: ENDOCRINOLOGY | Facility: CLINIC | Age: 78
End: 2023-05-04
Payer: MEDICARE

## 2023-05-04 VITALS
OXYGEN SATURATION: 98 % | SYSTOLIC BLOOD PRESSURE: 130 MMHG | DIASTOLIC BLOOD PRESSURE: 68 MMHG | HEIGHT: 73 IN | WEIGHT: 223 LBS | HEART RATE: 69 BPM | BODY MASS INDEX: 29.55 KG/M2

## 2023-05-04 DIAGNOSIS — E11.8 TYPE 2 DIABETES MELLITUS WITH UNSPECIFIED COMPLICATIONS: ICD-10-CM

## 2023-05-04 DIAGNOSIS — Z96.41 TYPE 2 DIABETES MELLITUS WITH UNSPECIFIED COMPLICATIONS: ICD-10-CM

## 2023-05-04 LAB
ALBUMIN SERPL ELPH-MCNC: 4.2 G/DL
ALP BLD-CCNC: 57 U/L
ALT SERPL-CCNC: 7 U/L
ANION GAP SERPL CALC-SCNC: 12 MMOL/L
AST SERPL-CCNC: 18 U/L
BILIRUB SERPL-MCNC: 0.6 MG/DL
BUN SERPL-MCNC: 33 MG/DL
CALCIUM SERPL-MCNC: 9.7 MG/DL
CHLORIDE SERPL-SCNC: 103 MMOL/L
CO2 SERPL-SCNC: 26 MMOL/L
CREAT SERPL-MCNC: 1.7 MG/DL
EGFR: 40 ML/MIN/1.73M2
GLUCOSE BLDC GLUCOMTR-MCNC: 99
GLUCOSE SERPL-MCNC: 136 MG/DL
POTASSIUM SERPL-SCNC: 4.3 MMOL/L
PROT SERPL-MCNC: 7.3 G/DL
SODIUM SERPL-SCNC: 140 MMOL/L

## 2023-05-04 PROCEDURE — 99215 OFFICE O/P EST HI 40 MIN: CPT | Mod: 25

## 2023-05-04 PROCEDURE — 95251 CONT GLUC MNTR ANALYSIS I&R: CPT

## 2023-05-04 PROCEDURE — 82962 GLUCOSE BLOOD TEST: CPT

## 2023-05-05 LAB
ALBUMIN SERPL ELPH-MCNC: 4.3 G/DL
ALP BLD-CCNC: 57 U/L
ALT SERPL-CCNC: 6 U/L
ANION GAP SERPL CALC-SCNC: 14 MMOL/L
AST SERPL-CCNC: 17 U/L
BILIRUB SERPL-MCNC: 0.6 MG/DL
BUN SERPL-MCNC: 33 MG/DL
CALCIUM SERPL-MCNC: 9.6 MG/DL
CHLORIDE SERPL-SCNC: 103 MMOL/L
CHOLEST SERPL-MCNC: 177 MG/DL
CO2 SERPL-SCNC: 24 MMOL/L
CREAT SERPL-MCNC: 1.68 MG/DL
CREAT SPEC-SCNC: 84 MG/DL
EGFR: 41 ML/MIN/1.73M2
ESTIMATED AVERAGE GLUCOSE: 148 MG/DL
GLUCOSE SERPL-MCNC: 139 MG/DL
HBA1C MFR BLD HPLC: 6.8 %
HDLC SERPL-MCNC: 59 MG/DL
LDLC SERPL CALC-MCNC: 108 MG/DL
MICROALBUMIN 24H UR DL<=1MG/L-MCNC: 1.5 MG/DL
MICROALBUMIN/CREAT 24H UR-RTO: 18 MG/G
NONHDLC SERPL-MCNC: 118 MG/DL
POTASSIUM SERPL-SCNC: 4.3 MMOL/L
PROT SERPL-MCNC: 7.2 G/DL
SODIUM SERPL-SCNC: 141 MMOL/L
T4 FREE SERPL-MCNC: 1.3 NG/DL
TRIGL SERPL-MCNC: 49 MG/DL
TSH SERPL-ACNC: 0.86 UIU/ML

## 2023-05-07 PROBLEM — E11.8 TYPE 2 DIABETES MELLITUS WITH COMPLICATION, WITH LONG TERM CURRENT USE OF INSULIN PUMP: Status: ACTIVE | Noted: 2018-12-02

## 2023-05-07 NOTE — HISTORY OF PRESENT ILLNESS
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, coming for f/u\par - BP well controlled, now only on arb/HCTZ ( off amlodipine and metoprolol)\par he believes his leg weakness is beter since Metformin was decreased, feeling much better on Mounjaro\par \par had  physically active at work only , eating bed diet too\par does bolusing without getting the sugar in the bolus wizard "I am too busy and my hands are dirty" \par does boluses with his OMNIPOD without checking sugar values, variable -300\par has DEXCOM from 11/2019 never received the training now on freestyle adenike not accurate \par had a pump failure episode, received a new one where his basal rates are slightly higher , however his sugars are still high\par per pt he does not have any more the settings for small, medium and high meal, also for the snack\par the old pod closed so we were not able to review except the large meal, I added the rest per pt memory \par I advised the pt to double check when he goes home\par \par does have days with no BS checked, per [place] the city [place] BS was 300 when he came back\par from insulin pump download BS 25? after dinner per tp he was never that low, feels low with BS 80s\par has Dexcom at home, never got the training \par  coming for a sooner f/u as he started Omnipod insulin pump , likes it very much thankful\par insulin pump downloaded and reviewed with the pt, copy scanned in the chart \par  has bedtime snacks with no bolusing , also not good with counting CHO does aproximation "big meal 75g CHO, small meal snack et"\par  saw optho [facility] Dr. [place]\par  9/18 reviewed \par  Severe Nonproliferative Diabetes in OD/OS stable. \par  Cystoid macular edema stable. \par  does not want free style adenike as he is too active , however willing to try dexcom \par  type 2 DM for almost 30 years "early 90's " first few years on Metformin only, complicated by PDR and neuropathy \par  Humalog Subcutaneous, used to take before breakfast 15-20units..........35-45 some times 50 units for supper\par  last Hga1c 8.7 on 7/20/18, 8.6 on 10/18\par  labs reviewed\par  tried Victoza made him sick \par  Toujeo 30units qhs changed few days ago see phone notes\par  Metformin ER 500mg 2tab bid \par  stopped Trulicity 1.5mg once a week for 2 weeks dose increased, has had some GERD\par  \par  \par  Microvascular complications: \par  Nephropathy denies normal Cr 3/16\par  Neuropathy symptoms, denies seen Podiatry long time ago was a waste of time per pt \par  Retinopathy yes s/p lasser tx and eye shots sees Retina specialist Dr [facility] \par  Macrovascular complications: \par  HTN on Atacand-HCTZ, Amlodipine, Coreg CR high today repeated better\par  CAD/CVA denies\par  Lipids at goal on Simvastatin 40mg qhs with LFT's normal\par  denies h/o pancreatitis\par  denies FHx medullary thyroid cancer. \par \par \par  [Hypoglycemia] : Patient is not hypoglycemic.

## 2023-05-07 NOTE — ASSESSMENT
[0] : 2) Feeling down, depressed, or hopeless: Not at all (0) [PHQ-2 Negative - No further assessment needed] : PHQ-2 Negative - No further assessment needed [[id number]] : 0 [FreeTextEntry1] : will contact CDE from [employer] to help with his new Omnipod dash, old one has battery issues per pt

## 2023-06-22 ENCOUNTER — RX RENEWAL (OUTPATIENT)
Age: 78
End: 2023-06-22

## 2023-06-29 ENCOUNTER — APPOINTMENT (OUTPATIENT)
Dept: ENDOCRINOLOGY | Facility: CLINIC | Age: 78
End: 2023-06-29
Payer: MEDICARE

## 2023-06-29 VITALS
DIASTOLIC BLOOD PRESSURE: 64 MMHG | OXYGEN SATURATION: 98 % | HEIGHT: 73 IN | SYSTOLIC BLOOD PRESSURE: 110 MMHG | BODY MASS INDEX: 28.89 KG/M2 | HEART RATE: 67 BPM | WEIGHT: 218 LBS

## 2023-06-29 LAB — GLUCOSE BLDC GLUCOMTR-MCNC: 136

## 2023-06-29 PROCEDURE — 99491 CHRNC CARE MGMT PHYS 1ST 30: CPT | Mod: 25

## 2023-06-29 PROCEDURE — 82962 GLUCOSE BLOOD TEST: CPT

## 2023-06-29 PROCEDURE — 95251 CONT GLUC MNTR ANALYSIS I&R: CPT

## 2023-06-29 PROCEDURE — 99215 OFFICE O/P EST HI 40 MIN: CPT | Mod: 25

## 2023-06-29 RX ORDER — BLOOD-GLUCOSE,RECEIVER,CONT
EACH MISCELLANEOUS
Qty: 1 | Refills: 0 | Status: ACTIVE | COMMUNITY
Start: 2023-06-29 | End: 1900-01-01

## 2023-06-29 RX ORDER — BLOOD-GLUCOSE SENSOR
EACH MISCELLANEOUS
Qty: 6 | Refills: 3 | Status: ACTIVE | COMMUNITY
Start: 2023-06-29 | End: 1900-01-01

## 2023-06-29 NOTE — ADDENDUM
[FreeTextEntry1] : mounjaro 2.5 - no complaints, states he has been using less insulin on pump since starting RX\par \par requesting Dexcom G7, would like it for his arm- patient would like consult (currently using Omnipod 4 and G6), uses receivers -noncompatible phone. c/o difference in BG vs Dexcom, calibrated in office today\par \par -RN A

## 2023-06-29 NOTE — HISTORY OF PRESENT ILLNESS
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, too variable BS without a trend \par started Mounjaro much better sugars \par , has a busy schedule , had back surgery 12/18/2020  Dr karimi [facility] now doing PT recovering slowly\par \par had  physically active at work only , eating bed diet too\par does bolusing without getting the sugar in the bolus wizard "I am too busy and my hands are dirty" \par does boluses with his OMNIPOD without checking sugar values, variable -300\par has DEXCOM from 11/2019 never received the training now on freestyle adenike not accurate \par had a pump failure episode, received a new one where his basal rates are slightly higher , however his sugars are still high\par per pt he does not have any more the settings for small, medium and high meal, also for the snack\par the old pod closed so we were not able to review except the large meal, I added the rest per pt memory \par I advised the pt to double check when he goes home\par \par does have days with no BS checked, [place] the city [place] BS was 300 when he came back\par from insulin pump download BS 25? after dinner per tp he was never that low, feels low with BS 80s\par has Dexcom at home, never got the training \par  coming for a sooner f/u as he started Omnipod insulin pump , likes it very much thankful\par insulin pump downloaded and reviewed with the pt, copy scanned in the chart \par  has bedtime snacks with no bolusing , also not good with counting CHO does aproximation "big meal 75g CHO, small meal snack et"\par  saw optho [facility] Dr. [place]\par  9/18 reviewed \par  Severe Nonproliferative Diabetes in OD/OS stable. \par  Cystoid macular edema stable. \par  does not want free style adenike as he is too active , however willing to try dexcom \par  type 2 DM for almost 30 years "early 90's " first few years on Metformin only, complicated by PDR and neuropathy \par  Humalog Subcutaneous, used to take before breakfast 15-20units..........35-45 some times 50 units for supper\par  last Hga1c 8.7 on 7/20/18, 8.6 on 10/18\par  labs reviewed\par  tried Victoza made him sick \par  Toujeo 30units qhs changed few days ago see phone notes\par  Metformin ER 500mg 2tab bid \par  stopped Trulicity 1.5mg once a week for 2 weeks dose increased, has had some GERD\par  \par  \par  Microvascular complications: \par  Nephropathy denies normal Cr 3/16\par  Neuropathy symptoms, denies seen Podiatry long time ago was a waste of time per pt \par  Retinopathy yes s/p lasser tx and eye shots sees Retina specialist Dr [facility] \par  Macrovascular complications: \par  HTN on Atacand-HCTZ, Amlodipine, Coreg CR high today repeated better\par  CAD/CVA denies\par  Lipids at goal on Simvastatin 40mg qhs with LFT's normal\par  denies h/o pancreatitis\par  denies FHx medullary thyroid cancer. \par \par \par  [Hypoglycemia] : Patient is not hypoglycemic.

## 2023-08-05 ENCOUNTER — RX RENEWAL (OUTPATIENT)
Age: 78
End: 2023-08-05

## 2023-09-18 ENCOUNTER — RX RENEWAL (OUTPATIENT)
Age: 78
End: 2023-09-18

## 2023-10-10 ENCOUNTER — APPOINTMENT (OUTPATIENT)
Dept: ENDOCRINOLOGY | Facility: CLINIC | Age: 78
End: 2023-10-10
Payer: MEDICARE

## 2023-10-10 VITALS
SYSTOLIC BLOOD PRESSURE: 110 MMHG | BODY MASS INDEX: 27.43 KG/M2 | OXYGEN SATURATION: 98 % | HEIGHT: 73 IN | WEIGHT: 207 LBS | DIASTOLIC BLOOD PRESSURE: 62 MMHG | HEART RATE: 62 BPM

## 2023-10-10 DIAGNOSIS — N18.31 CHRONIC KIDNEY DISEASE, STAGE 3A: ICD-10-CM

## 2023-10-10 LAB
ALBUMIN SERPL ELPH-MCNC: 4.2 G/DL
ALP BLD-CCNC: 56 U/L
ALT SERPL-CCNC: 6 U/L
ANION GAP SERPL CALC-SCNC: 9 MMOL/L
AST SERPL-CCNC: 15 U/L
BILIRUB SERPL-MCNC: 0.8 MG/DL
BUN SERPL-MCNC: 30 MG/DL
CALCIUM SERPL-MCNC: 9.3 MG/DL
CHLORIDE SERPL-SCNC: 102 MMOL/L
CHOLEST SERPL-MCNC: 166 MG/DL
CO2 SERPL-SCNC: 29 MMOL/L
CREAT SERPL-MCNC: 1.69 MG/DL
CREAT SPEC-SCNC: 93 MG/DL
EGFR: 41 ML/MIN/1.73M2
ESTIMATED AVERAGE GLUCOSE: 146 MG/DL
GLUCOSE BLDC GLUCOMTR-MCNC: 135
GLUCOSE SERPL-MCNC: 116 MG/DL
HBA1C MFR BLD HPLC: 6.7 %
HDLC SERPL-MCNC: 57 MG/DL
LDLC SERPL CALC-MCNC: 97 MG/DL
MICROALBUMIN 24H UR DL<=1MG/L-MCNC: <1.2 MG/DL
MICROALBUMIN/CREAT 24H UR-RTO: NORMAL MG/G
NONHDLC SERPL-MCNC: 108 MG/DL
POTASSIUM SERPL-SCNC: 5.1 MMOL/L
PROT SERPL-MCNC: 7.2 G/DL
SODIUM SERPL-SCNC: 141 MMOL/L
TRIGL SERPL-MCNC: 52 MG/DL
TSH SERPL-ACNC: 0.71 UIU/ML

## 2023-10-10 PROCEDURE — 95251 CONT GLUC MNTR ANALYSIS I&R: CPT

## 2023-10-10 PROCEDURE — 82962 GLUCOSE BLOOD TEST: CPT

## 2023-10-10 PROCEDURE — 99215 OFFICE O/P EST HI 40 MIN: CPT | Mod: 25

## 2023-10-10 PROCEDURE — 99491 CHRNC CARE MGMT PHYS 1ST 30: CPT | Mod: 25

## 2023-10-10 RX ORDER — METFORMIN HYDROCHLORIDE 1000 MG/1
1000 TABLET, FILM COATED, EXTENDED RELEASE ORAL
Qty: 90 | Refills: 1 | Status: DISCONTINUED | COMMUNITY
Start: 2022-07-07 | End: 2023-10-10

## 2023-10-10 RX ORDER — CARVEDILOL PHOSPHATE 80 MG/1
80 CAPSULE, EXTENDED RELEASE ORAL
Qty: 90 | Refills: 3 | Status: DISCONTINUED | COMMUNITY
Start: 2019-05-29 | End: 2023-10-10

## 2023-11-06 ENCOUNTER — RX RENEWAL (OUTPATIENT)
Age: 78
End: 2023-11-06

## 2023-11-30 PROBLEM — N18.31 STAGE 3A CHRONIC KIDNEY DISEASE: Status: ACTIVE | Noted: 2022-09-14

## 2023-12-05 ENCOUNTER — APPOINTMENT (OUTPATIENT)
Dept: CARDIOLOGY | Facility: CLINIC | Age: 78
End: 2023-12-05
Payer: MEDICARE

## 2023-12-05 ENCOUNTER — NON-APPOINTMENT (OUTPATIENT)
Age: 78
End: 2023-12-05

## 2023-12-05 VITALS
DIASTOLIC BLOOD PRESSURE: 60 MMHG | HEART RATE: 86 BPM | WEIGHT: 205 LBS | SYSTOLIC BLOOD PRESSURE: 100 MMHG | BODY MASS INDEX: 27.17 KG/M2 | HEIGHT: 73 IN

## 2023-12-05 PROCEDURE — 93000 ELECTROCARDIOGRAM COMPLETE: CPT

## 2023-12-05 PROCEDURE — 99204 OFFICE O/P NEW MOD 45 MIN: CPT | Mod: 25

## 2023-12-05 RX ORDER — CANDESARTAN CILEXETIL AND HYDROCHLOROTHIAZIDE 32; 25 MG/1; MG/1
32-25 TABLET ORAL DAILY
Qty: 90 | Refills: 0 | Status: DISCONTINUED | COMMUNITY
Start: 2022-10-18 | End: 2023-12-05

## 2023-12-27 ENCOUNTER — RX RENEWAL (OUTPATIENT)
Age: 78
End: 2023-12-27

## 2023-12-27 RX ORDER — METFORMIN HYDROCHLORIDE 500 MG/1
500 TABLET, FILM COATED, EXTENDED RELEASE ORAL
Qty: 90 | Refills: 1 | Status: ACTIVE | COMMUNITY
Start: 2023-12-27 | End: 1900-01-01

## 2023-12-29 ENCOUNTER — RX RENEWAL (OUTPATIENT)
Age: 78
End: 2023-12-29

## 2024-01-05 RX ORDER — ISOPROPYL ALCOHOL 0.7 ML/ML
SWAB TOPICAL
Qty: 1 | Refills: 3 | Status: ACTIVE | COMMUNITY
Start: 2024-01-03 | End: 1900-01-01

## 2024-01-06 ENCOUNTER — LABORATORY RESULT (OUTPATIENT)
Age: 79
End: 2024-01-06

## 2024-01-08 ENCOUNTER — RX RENEWAL (OUTPATIENT)
Age: 79
End: 2024-01-08

## 2024-01-08 RX ORDER — EMPAGLIFLOZIN 25 MG/1
25 TABLET, FILM COATED ORAL
Qty: 90 | Refills: 1 | Status: ACTIVE | COMMUNITY
Start: 2022-04-05 | End: 1900-01-01

## 2024-01-11 ENCOUNTER — APPOINTMENT (OUTPATIENT)
Dept: ENDOCRINOLOGY | Facility: CLINIC | Age: 79
End: 2024-01-11

## 2024-01-17 NOTE — REVIEW OF SYSTEMS
Quality 111:Pneumonia Vaccination Status For Older Adults: Patient did not receive any pneumococcal conjugate or polysaccharide vaccine on or after their 60th birthday and before the end of the measurement period Quality 47: Advance Care Plan: Advance Care Planning discussed and documented in the medical record; patient did not wish or was not able to name a surrogate decision maker or provide an advance care plan. Quality 130: Documentation Of Current Medications In The Medical Record: Current Medications Documented Quality 431: Preventive Care And Screening: Unhealthy Alcohol Use - Screening: Patient not identified as an unhealthy alcohol user when screened for unhealthy alcohol use using a systematic screening method Detail Level: Detailed Quality 226: Preventive Care And Screening: Tobacco Use: Screening And Cessation Intervention: Patient screened for tobacco use and is an ex/non-smoker [Negative] : Heme/Lymph

## 2024-02-08 ENCOUNTER — RX RENEWAL (OUTPATIENT)
Age: 79
End: 2024-02-08

## 2024-02-09 RX ORDER — INSULIN PUMP CONTROLLER
EACH MISCELLANEOUS
Qty: 45 | Refills: 3 | Status: ACTIVE | COMMUNITY
Start: 2024-02-08 | End: 1900-01-01

## 2024-02-09 RX ORDER — INSULIN PUMP CONTROLLER
EACH MISCELLANEOUS
Qty: 9 | Refills: 3 | Status: ACTIVE | COMMUNITY
Start: 2019-07-15 | End: 1900-01-01

## 2024-03-08 RX ORDER — TIRZEPATIDE 5 MG/.5ML
5 INJECTION, SOLUTION SUBCUTANEOUS
Qty: 3 | Refills: 1 | Status: ACTIVE | COMMUNITY
Start: 2023-10-10 | End: 1900-01-01

## 2024-03-08 RX ORDER — GLUCAGON 1 MG
1 KIT INJECTION
Qty: 2 | Refills: 1 | Status: ACTIVE | COMMUNITY
Start: 2024-01-03 | End: 1900-01-01

## 2024-03-20 ENCOUNTER — RX RENEWAL (OUTPATIENT)
Age: 79
End: 2024-03-20

## 2024-03-20 RX ORDER — INSULIN LISPRO 100 [IU]/ML
100 INJECTION, SOLUTION INTRAVENOUS; SUBCUTANEOUS
Qty: 50 | Refills: 1 | Status: ACTIVE | COMMUNITY
Start: 2022-06-24 | End: 1900-01-01

## 2024-04-11 ENCOUNTER — APPOINTMENT (OUTPATIENT)
Dept: ENDOCRINOLOGY | Facility: CLINIC | Age: 79
End: 2024-04-11
Payer: MEDICARE

## 2024-04-11 VITALS
HEIGHT: 73 IN | OXYGEN SATURATION: 98 % | HEART RATE: 74 BPM | DIASTOLIC BLOOD PRESSURE: 72 MMHG | SYSTOLIC BLOOD PRESSURE: 102 MMHG | WEIGHT: 206 LBS | BODY MASS INDEX: 27.3 KG/M2

## 2024-04-11 DIAGNOSIS — Z96.41 PRESENCE OF INSULIN PUMP (EXTERNAL) (INTERNAL): ICD-10-CM

## 2024-04-11 DIAGNOSIS — E08.3511: ICD-10-CM

## 2024-04-11 DIAGNOSIS — E11.8 TYPE 2 DIABETES MELLITUS WITH UNSPECIFIED COMPLICATIONS: ICD-10-CM

## 2024-04-11 DIAGNOSIS — N18.9 CHRONIC KIDNEY DISEASE, UNSPECIFIED: ICD-10-CM

## 2024-04-11 DIAGNOSIS — Z96.41 TYPE 2 DIABETES MELLITUS WITH UNSPECIFIED COMPLICATIONS: ICD-10-CM

## 2024-04-11 DIAGNOSIS — E66.9 OBESITY, UNSPECIFIED: ICD-10-CM

## 2024-04-11 LAB
ALBUMIN SERPL ELPH-MCNC: 3.9 G/DL
ALP BLD-CCNC: 56 U/L
ALT SERPL-CCNC: 8 U/L
ANION GAP SERPL CALC-SCNC: 13 MMOL/L
AST SERPL-CCNC: 18 U/L
BILIRUB SERPL-MCNC: 0.7 MG/DL
BUN SERPL-MCNC: 26 MG/DL
CALCIUM SERPL-MCNC: 9.2 MG/DL
CHLORIDE SERPL-SCNC: 106 MMOL/L
CHOLEST SERPL-MCNC: 143 MG/DL
CK SERPL-CCNC: 82 U/L
CO2 SERPL-SCNC: 24 MMOL/L
CREAT SERPL-MCNC: 1.55 MG/DL
CREAT SPEC-SCNC: 118 MG/DL
EGFR: 45 ML/MIN/1.73M2
ESTIMATED AVERAGE GLUCOSE: 128 MG/DL
GLUCOSE BLDC GLUCOMTR-MCNC: 98
GLUCOSE SERPL-MCNC: 68 MG/DL
HBA1C MFR BLD HPLC: 6.1 %
HDLC SERPL-MCNC: 59 MG/DL
LDLC SERPL CALC-MCNC: 75 MG/DL
MICROALBUMIN 24H UR DL<=1MG/L-MCNC: 10.5 MG/DL
MICROALBUMIN/CREAT 24H UR-RTO: 88 MG/G
NONHDLC SERPL-MCNC: 85 MG/DL
POTASSIUM SERPL-SCNC: 4.4 MMOL/L
PROT SERPL-MCNC: 7.2 G/DL
SODIUM SERPL-SCNC: 143 MMOL/L
TRIGL SERPL-MCNC: 41 MG/DL

## 2024-04-11 PROCEDURE — 99491 CHRNC CARE MGMT PHYS 1ST 30: CPT

## 2024-04-11 PROCEDURE — 95251 CONT GLUC MNTR ANALYSIS I&R: CPT

## 2024-04-11 PROCEDURE — 99215 OFFICE O/P EST HI 40 MIN: CPT | Mod: 25

## 2024-04-11 PROCEDURE — 82962 GLUCOSE BLOOD TEST: CPT

## 2024-04-11 RX ORDER — TIRZEPATIDE 2.5 MG/.5ML
2.5 INJECTION, SOLUTION SUBCUTANEOUS
Qty: 3 | Refills: 1 | Status: DISCONTINUED | COMMUNITY
Start: 2023-02-06 | End: 2024-04-11

## 2024-04-11 RX ORDER — METFORMIN ER 500 MG 500 MG/1
500 TABLET ORAL
Qty: 90 | Refills: 1 | Status: DISCONTINUED | COMMUNITY
Start: 2023-10-10 | End: 2024-04-11

## 2024-04-12 PROBLEM — E08.3511: Status: ACTIVE | Noted: 2023-10-10

## 2024-04-12 PROBLEM — E11.8 TYPE 2 DIABETES MELLITUS WITH COMPLICATION, WITH LONG TERM CURRENT USE OF INSULIN PUMP: Status: ACTIVE | Noted: 2018-12-02

## 2024-04-12 NOTE — END OF VISIT
[Time Spent: ___ minutes] : I have spent [unfilled] minutes of time on the encounter. [FreeTextEntry3] :  I, Alexander Jefferson, am scribing for and in the presence of Dr. Keara Pollard in the following sections: HISTORY OF PRESENT ILLNESS; REVIEW OF SYSTEMS; PHYSICAL EXAM; ASSESSMENT/ PLAN. I, Keara Pollard, personally performed the services described in the documentation, reviewed the documentation recorded by the scribe in my presence, and it accurately and completely records my words and actions. 4/11/2024.

## 2024-04-12 NOTE — PHYSICAL EXAM
[Alert] : alert [Well Nourished] : well nourished [No Acute Distress] : no acute distress [Well Developed] : well developed [Normal Sclera/Conjunctiva] : normal sclera/conjunctiva [EOMI] : extra ocular movement intact [No Proptosis] : no proptosis [Normal Oropharynx] : the oropharynx was normal [Thyroid Not Enlarged] : the thyroid was not enlarged [No Thyroid Nodules] : no palpable thyroid nodules [No Respiratory Distress] : no respiratory distress [No Accessory Muscle Use] : no accessory muscle use [Clear to Auscultation] : lungs were clear to auscultation bilaterally [Normal S1, S2] : normal S1 and S2 [Normal Rate] : heart rate was normal [Regular Rhythm] : with a regular rhythm [No Edema] : no peripheral edema [Normal Bowel Sounds] : normal bowel sounds [Not Tender] : non-tender [Not Distended] : not distended [Soft] : abdomen soft [Normal Anterior Cervical Nodes] : no anterior cervical lymphadenopathy [No Spinal Tenderness] : no spinal tenderness [Spine Straight] : spine straight [No Stigmata of Cushings Syndrome] : no stigmata of Cushings Syndrome [Normal Gait] : normal gait [Normal Strength/Tone] : muscle strength and tone were normal [No Rash] : no rash [Normal Reflexes] : deep tendon reflexes were 2+ and symmetric [Oriented x3] : oriented to person, place, and time [No Tremors] : no tremors [Acanthosis Nigricans] : no acanthosis nigricans [de-identified] : Overweight

## 2024-04-12 NOTE — HISTORY OF PRESENT ILLNESS
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 with worse sugars forgets to bolus lately ends up with 300 in the afternoon  had back surgery 12/18/2020   [facility] now doing PT recovering slowly  had  physically active at work only , eating bed diet too does bolusing without getting the sugar in the bolus wizard "I am too busy and my hands are dirty"  does boluses with his OMNIPOD without checking sugar values, variable -300 has DEXCOM from 11/2019 never received the training now on freestyle adenike not accurate  had a pump failure episode, received a new one where his basal rates are slightly higher , however his sugars are still high per pt he does not have any more the settings for small, medium and high meal, also for the snack the old pod closed so we were not able to review except the large meal, I added the rest per pt memory  I advised the pt to double check when he goes home  does have days with no BS checked, [place] the [place] BS was 300 when he came back from insulin pump download BS 25? after dinner per tp he was never that low, feels low with BS 80s has Dexcom at home, never got the training   coming for a sooner f/u as he started Omnipod insulin pump , likes it very much thankful insulin pump downloaded and reviewed with the pt, copy scanned in the chart   has bedtime snacks with no bolusing , also not good with counting CHO does aproximation "big meal 75g CHO, small meal snack et"  saw optho [facility] Dr. Oviedo  9/18 reviewed   Severe Nonproliferative Diabetes in OD/OS stable.   Cystoid macular edema stable.   does not want free style adenike as he is too active , however willing to try dexcom   type 2 DM for almost 30 years "early 90's " first few years on Metformin only, complicated by PDR and neuropathy   Humalog Subcutaneous, used to take before breakfast 15-20units..........35-45 some times 50 units for supper  last Hga1c 8.7 on 7/20/18, 8.6 on 10/18  labs reviewed  tried Victoza made him sick   Toujeo 30units qhs changed few days ago see phone notes  Metformin ER 500mg 2tab bid   stopped Trulicity 1.5mg once a week for 2 weeks dose increased, has had some GERD      Microvascular complications:   Nephropathy denies normal Cr 3/16  Neuropathy symptoms, denies seen Podiatry long time ago was a waste of time per pt   Retinopathy yes s/p lasser tx and eye shots sees Retina specialist Dr [facility]   Macrovascular complications:   HTN on Atacand-HCTZ, Amlodipine, Coreg CR high today repeated better  CAD/CVA denies  Lipids at goal on Simvastatin 40mg qhs with LFT's normal  denies h/o pancreatitis  denies FHx medullary thyroid cancer.   4/11/24 follow up-  Thinks he is doing well. Better control of sugars on Mounjaro. A1c down to 6.1.  Sugar was 98 after toast this morning. Has Omnipod 2 but cannot download it here. Manually reviewed, highest sugar reading was 227 and lowest was 100.  DEXCOM CGM also reviewed. Commonly having lows around 6 am and highs around 6 pm/ dinnertime.  He states if he takes Humalog before dinner, he goes high and then it goes down on its own.  If he does not take Humalog before dinner, he goes high to 250-260. When he tries to correct, he goes very low.  He reports diminished appetite on Mounjaro and has to force himself to eat. He gets low sugar if he does not eat all day. For the next 2 days after Mounjaro, he states he does not need to bolus at all. He believes he may be getting low sugars at night the first few days after Mounjaro.   Saw cardiology Dr. Monge, note reviewed. He has a RBBB. Dr. Mnoge wanted to have a stress ECG done.  Dr. Monge had decreased his BP medicine. Still on low side of normal despite compliance. Denies dizziness. Pt bought a machine at home and checks BP regularly. Reports systolic readings 130-145 recently.  [Hypoglycemia] : Patient is not hypoglycemic.

## 2024-04-14 LAB
C PEPTIDE SERPL-MCNC: <0.1 NG/ML
GLUCOSE BS SERPL-MCNC: 77 MG/DL

## 2024-05-28 ENCOUNTER — APPOINTMENT (OUTPATIENT)
Dept: CARDIOLOGY | Facility: CLINIC | Age: 79
End: 2024-05-28
Payer: MEDICARE

## 2024-05-28 ENCOUNTER — NON-APPOINTMENT (OUTPATIENT)
Age: 79
End: 2024-05-28

## 2024-05-28 VITALS
HEIGHT: 73 IN | HEART RATE: 69 BPM | OXYGEN SATURATION: 98 % | RESPIRATION RATE: 14 BRPM | BODY MASS INDEX: 27.3 KG/M2 | DIASTOLIC BLOOD PRESSURE: 60 MMHG | SYSTOLIC BLOOD PRESSURE: 90 MMHG | WEIGHT: 206 LBS

## 2024-05-28 DIAGNOSIS — E78.5 HYPERLIPIDEMIA, UNSPECIFIED: ICD-10-CM

## 2024-05-28 DIAGNOSIS — R94.31 ABNORMAL ELECTROCARDIOGRAM [ECG] [EKG]: ICD-10-CM

## 2024-05-28 DIAGNOSIS — I10 ESSENTIAL (PRIMARY) HYPERTENSION: ICD-10-CM

## 2024-05-28 PROCEDURE — 93000 ELECTROCARDIOGRAM COMPLETE: CPT

## 2024-05-28 PROCEDURE — 99214 OFFICE O/P EST MOD 30 MIN: CPT | Mod: 25

## 2024-05-28 RX ORDER — CANDESARTAN CILEXETIL 32 MG/1
32 TABLET ORAL
Qty: 90 | Refills: 3 | Status: ACTIVE | COMMUNITY
Start: 2023-12-05 | End: 1900-01-01

## 2024-05-28 NOTE — ASSESSMENT
[FreeTextEntry1] : 78 yo male with hypertension, DM type 2 (diagnosed 1990s), hyperlipidemia, and abnormal ECG. ECG today demonstrated sinus rhythm with RBBB and low voltage QRS.  Given abnormal ECG and hypertension, will perform echocardiogram to assess LV function and structural heart disease. After review of echo results, will determine if further cardiac work-up or intervention is clinically indicated.  Patient with controlled BP. He reports -130s per home readings. Will continue candesartan 32 mg po daily and Coreg CR 80 mg po daily.  LDL 75 per 4/2024 labs. Will continue simvastatin 40 mg po daily for hyperlipidemia management. Will order coronary calcium score to assess for CAD. Pending review of test results, will determine if further cardiac work-up or intervention is clinically indicated.

## 2024-05-28 NOTE — CARDIOLOGY SUMMARY
[de-identified] : 5/28/24 ECG: Sinus rhythm, rate 61 bpm, RBBB, low voltage QRS 12/5/23 ECG: Sinus rhythm, rate 78 bpm, RBBB, low voltage QRS

## 2024-05-28 NOTE — PHYSICAL EXAM
[Well Developed] : well developed [Well Nourished] : well nourished [No Acute Distress] : no acute distress [Normal Conjunctiva] : normal conjunctiva [Normal Venous Pressure] : normal venous pressure [No Carotid Bruit] : no carotid bruit [Normal Rate] : normal [Rhythm Regular] : regular [Normal S1] : normal S1 [Normal S2] : normal S2 [I] : a grade 1 [No Pitting Edema] : no pitting edema present [Clear Lung Fields] : clear lung fields [Good Air Entry] : good air entry [No Respiratory Distress] : no respiratory distress  [No Edema] : no edema [No Cyanosis] : no cyanosis [No Clubbing] : no clubbing [Moves all extremities] : moves all extremities [No Focal Deficits] : no focal deficits [Normal Speech] : normal speech [Alert and Oriented] : alert and oriented [Normal memory] : normal memory [S3] : no S3 [S4] : no S4 [Right Carotid Bruit] : no bruit heard over the right carotid [Left Carotid Bruit] : no bruit heard over the left carotid

## 2024-05-28 NOTE — HISTORY OF PRESENT ILLNESS
[FreeTextEntry1] : 78 yo male with hypertension, DM type 2 (diagnosed 1990s), hyperlipidemia, and abnormal ECG, who presents today for follow-up. Patient currently denies chest pain, dyspnea, palpitations, syncope, edema, melena, hematochezia, or hematemesis. He currently exercises at home a few days per week. He reported having a bad viral URI at end of Dec 2023 and reaction to RSV vaccine in Jan 2024, which prevented him from scheduling his cardiac tests after his last Dec 2023 office appointment.

## 2024-06-26 ENCOUNTER — APPOINTMENT (OUTPATIENT)
Dept: CARDIOLOGY | Facility: CLINIC | Age: 79
End: 2024-06-26

## 2024-06-27 ENCOUNTER — APPOINTMENT (OUTPATIENT)
Dept: ENDOCRINOLOGY | Facility: CLINIC | Age: 79
End: 2024-06-27
Payer: MEDICARE

## 2024-06-27 PROCEDURE — 97802 MEDICAL NUTRITION INDIV IN: CPT

## 2024-07-01 ENCOUNTER — RX RENEWAL (OUTPATIENT)
Age: 79
End: 2024-07-01

## 2024-07-01 RX ORDER — CARVEDILOL 25 MG/1
25 TABLET, FILM COATED ORAL
Qty: 180 | Refills: 3 | Status: ACTIVE | COMMUNITY
Start: 2024-07-01 | End: 1900-01-01

## 2024-07-10 ENCOUNTER — NON-APPOINTMENT (OUTPATIENT)
Age: 79
End: 2024-07-10

## 2024-07-11 ENCOUNTER — APPOINTMENT (OUTPATIENT)
Dept: ENDOCRINOLOGY | Facility: CLINIC | Age: 79
End: 2024-07-11
Payer: MEDICARE

## 2024-07-11 VITALS
SYSTOLIC BLOOD PRESSURE: 112 MMHG | BODY MASS INDEX: 26.77 KG/M2 | HEIGHT: 73 IN | HEART RATE: 69 BPM | OXYGEN SATURATION: 99 % | WEIGHT: 202 LBS | DIASTOLIC BLOOD PRESSURE: 60 MMHG

## 2024-07-11 DIAGNOSIS — I10 ESSENTIAL (PRIMARY) HYPERTENSION: ICD-10-CM

## 2024-07-11 DIAGNOSIS — E66.9 OBESITY, UNSPECIFIED: ICD-10-CM

## 2024-07-11 DIAGNOSIS — Z13.820 ENCOUNTER FOR SCREENING FOR OSTEOPOROSIS: ICD-10-CM

## 2024-07-11 DIAGNOSIS — E11.8 TYPE 2 DIABETES MELLITUS WITH UNSPECIFIED COMPLICATIONS: ICD-10-CM

## 2024-07-11 DIAGNOSIS — E08.3511: ICD-10-CM

## 2024-07-11 DIAGNOSIS — N18.9 CHRONIC KIDNEY DISEASE, UNSPECIFIED: ICD-10-CM

## 2024-07-11 DIAGNOSIS — Z96.41 TYPE 2 DIABETES MELLITUS WITH UNSPECIFIED COMPLICATIONS: ICD-10-CM

## 2024-07-11 DIAGNOSIS — Z96.41 PRESENCE OF INSULIN PUMP (EXTERNAL) (INTERNAL): ICD-10-CM

## 2024-07-11 DIAGNOSIS — E78.5 HYPERLIPIDEMIA, UNSPECIFIED: ICD-10-CM

## 2024-07-11 LAB
ALBUMIN SERPL ELPH-MCNC: 4 G/DL
ALP BLD-CCNC: 49 U/L
ALT SERPL-CCNC: 8 U/L
ANION GAP SERPL CALC-SCNC: 10 MMOL/L
AST SERPL-CCNC: 16 U/L
BILIRUB SERPL-MCNC: 0.6 MG/DL
BUN SERPL-MCNC: 26 MG/DL
CALCIUM SERPL-MCNC: 9 MG/DL
CHLORIDE SERPL-SCNC: 107 MMOL/L
CHOLEST SERPL-MCNC: 148 MG/DL
CO2 SERPL-SCNC: 25 MMOL/L
CREAT SERPL-MCNC: 1.44 MG/DL
CREAT SPEC-SCNC: 127 MG/DL
EGFR: 49 ML/MIN/1.73M2
ESTIMATED AVERAGE GLUCOSE: 148 MG/DL
GLUCOSE SERPL-MCNC: 77 MG/DL
HBA1C MFR BLD HPLC: 6.8 %
HDLC SERPL-MCNC: 54 MG/DL
LDLC SERPL CALC-MCNC: 83 MG/DL
MICROALBUMIN 24H UR DL<=1MG/L-MCNC: 3.8 MG/DL
MICROALBUMIN/CREAT 24H UR-RTO: 30 MG/G
NONHDLC SERPL-MCNC: 94 MG/DL
POTASSIUM SERPL-SCNC: 4.2 MMOL/L
PROT SERPL-MCNC: 7 G/DL
SODIUM SERPL-SCNC: 142 MMOL/L
TRIGL SERPL-MCNC: 51 MG/DL

## 2024-07-11 PROCEDURE — 82962 GLUCOSE BLOOD TEST: CPT

## 2024-07-11 PROCEDURE — 99215 OFFICE O/P EST HI 40 MIN: CPT | Mod: 25

## 2024-07-11 PROCEDURE — 97803 MED NUTRITION INDIV SUBSEQ: CPT

## 2024-07-11 PROCEDURE — 95251 CONT GLUC MNTR ANALYSIS I&R: CPT

## 2024-07-17 ENCOUNTER — APPOINTMENT (OUTPATIENT)
Dept: ENDOCRINOLOGY | Facility: CLINIC | Age: 79
End: 2024-07-17
Payer: MEDICARE

## 2024-07-17 PROCEDURE — 97803 MED NUTRITION INDIV SUBSEQ: CPT

## 2024-07-23 ENCOUNTER — APPOINTMENT (OUTPATIENT)
Dept: CARDIOLOGY | Facility: CLINIC | Age: 79
End: 2024-07-23

## 2024-08-01 ENCOUNTER — APPOINTMENT (OUTPATIENT)
Dept: ENDOCRINOLOGY | Facility: CLINIC | Age: 79
End: 2024-08-01
Payer: MEDICARE

## 2024-08-01 PROCEDURE — 97803 MED NUTRITION INDIV SUBSEQ: CPT

## 2024-08-05 ENCOUNTER — NON-APPOINTMENT (OUTPATIENT)
Age: 79
End: 2024-08-05

## 2024-08-07 ENCOUNTER — NON-APPOINTMENT (OUTPATIENT)
Age: 79
End: 2024-08-07

## 2024-08-21 ENCOUNTER — NON-APPOINTMENT (OUTPATIENT)
Age: 79
End: 2024-08-21

## 2024-09-20 ENCOUNTER — RX RENEWAL (OUTPATIENT)
Age: 79
End: 2024-09-20

## 2024-09-25 ENCOUNTER — RESULT REVIEW (OUTPATIENT)
Age: 79
End: 2024-09-25

## 2024-10-17 ENCOUNTER — APPOINTMENT (OUTPATIENT)
Dept: ENDOCRINOLOGY | Facility: CLINIC | Age: 79
End: 2024-10-17

## 2024-10-17 VITALS
HEIGHT: 73 IN | OXYGEN SATURATION: 98 % | DIASTOLIC BLOOD PRESSURE: 62 MMHG | SYSTOLIC BLOOD PRESSURE: 114 MMHG | WEIGHT: 198 LBS | HEART RATE: 64 BPM | BODY MASS INDEX: 26.24 KG/M2

## 2024-10-17 DIAGNOSIS — N18.9 CHRONIC KIDNEY DISEASE, UNSPECIFIED: ICD-10-CM

## 2024-10-17 DIAGNOSIS — E78.5 HYPERLIPIDEMIA, UNSPECIFIED: ICD-10-CM

## 2024-10-17 DIAGNOSIS — E08.3511: ICD-10-CM

## 2024-10-17 DIAGNOSIS — E11.8 TYPE 2 DIABETES MELLITUS WITH UNSPECIFIED COMPLICATIONS: ICD-10-CM

## 2024-10-17 DIAGNOSIS — I10 ESSENTIAL (PRIMARY) HYPERTENSION: ICD-10-CM

## 2024-10-17 DIAGNOSIS — E66.811 OBESITY, CLASS 1: ICD-10-CM

## 2024-10-17 DIAGNOSIS — Z96.41 PRESENCE OF INSULIN PUMP (EXTERNAL) (INTERNAL): ICD-10-CM

## 2024-10-17 DIAGNOSIS — Z96.41 TYPE 2 DIABETES MELLITUS WITH UNSPECIFIED COMPLICATIONS: ICD-10-CM

## 2024-10-17 DIAGNOSIS — Z13.820 ENCOUNTER FOR SCREENING FOR OSTEOPOROSIS: ICD-10-CM

## 2024-10-17 LAB — GLUCOSE BLDC GLUCOMTR-MCNC: 138

## 2024-10-17 PROCEDURE — 99215 OFFICE O/P EST HI 40 MIN: CPT

## 2024-10-17 PROCEDURE — 95251 CONT GLUC MNTR ANALYSIS I&R: CPT

## 2024-10-17 PROCEDURE — G2211 COMPLEX E/M VISIT ADD ON: CPT

## 2024-12-17 ENCOUNTER — RX RENEWAL (OUTPATIENT)
Age: 79
End: 2024-12-17

## 2024-12-17 RX ORDER — TIRZEPATIDE 5 MG/.5ML
5 INJECTION, SOLUTION SUBCUTANEOUS
Qty: 6 | Refills: 2 | Status: ACTIVE | COMMUNITY
Start: 2024-12-17 | End: 1900-01-01

## 2024-12-30 ENCOUNTER — RX RENEWAL (OUTPATIENT)
Age: 79
End: 2024-12-30

## 2025-01-20 NOTE — HISTORY OF PRESENT ILLNESS
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, too variable BS without a trend , has a busy schedule , had back surgery 12/18/2020  Dr karimi [facility] now doing PT recovering slowlly\par \par had  physically active at work only , eating bed diet too\par does bolusing without getting the sugar in the bolus wizard "I am too busy and my hands are dirty" \par does boluses with his OMNIPOD without checking sugar values, variable -300\par has DEXCOM from 11/2019 never received the training \par had a pump failure episode, received a new one where his basal rates are slightly higher , however his sugars are still high\par per pt he does not have any more the settings for small, medium and high meal, also for the snack\par the old pod closed so we were not able to review except the large meal, I added the rest per pt memory \par I advised the pt to double check when he goes home\par \par does have days with no BS checked, [place] the city [place] BS was 300 when he came back\par from insulin pump download BS 25? after dinner per tp he was never that low, feels low with BS 80s\par has Dexcom at home, never got the training \par  coming for a sooner f/u as he started Omnipod insulin pump , likes it very much thankful\par insulin pump downloaded and reviewed with the pt, copy scanned in the chart \par  has bedtime snacks with no bolusing , also not good with counting CHO does aproximation "big meal 75g CHO, small meal snack et"\par  saw optho [facility] Dr. Oviedo\par  9/18 reviewed \par  Severe Nonproliferative Diabetes in OD/OS stable. \par  Cystoid macular edema stable. \par  does not want free style adenike as he is too active , however willing to try dexcom \par  type 2 DM for almost 30 years "early 90's " first few years on Metformin only, complicated by PDR and neuropathy \par  Humalog Subcutaneous, used to take before breakfast 15-20units..........35-45 some times 50 units for supper\par  last Hga1c 8.7 on 7/20/18, 8.6 on 10/18\par  labs reviewed\par  tried Victoza made him sick \par  Toujeo 30units qhs changed few days ago see phone notes\par  Metformin ER 500mg 2tab bid \par  Trulicity 1.5mg once a week for 2 weeks dose increased, has had some GERD\par  \par  \par  Microvascular complications: \par  Nephropathy denies normal Cr 3/16\par  Neuropathy symptoms, denies seen Podiatry long time ago was a waste of time per pt \par  Retinopathy yes s/p lasser tx and eye shots sees Retina specialist Dr [facility] \par  Macrovascular complications: \par  HTN on Atacand-HCTZ, Amlodipine, Coreg CR high today repeated better\par  CAD/CVA denies\par  Lipids at goal on Simvastatin 40mg qhs with LFT's normal\par  denies h/o pancreatitis\par  denies FHx medullary thyroid cancer. \par \par \par 
English

## 2025-01-23 ENCOUNTER — APPOINTMENT (OUTPATIENT)
Dept: ENDOCRINOLOGY | Facility: CLINIC | Age: 80
End: 2025-01-23

## 2025-02-25 ENCOUNTER — APPOINTMENT (OUTPATIENT)
Dept: ENDOCRINOLOGY | Facility: CLINIC | Age: 80
End: 2025-02-25
Payer: MEDICARE

## 2025-02-25 VITALS
BODY MASS INDEX: 26.51 KG/M2 | WEIGHT: 200 LBS | HEART RATE: 64 BPM | DIASTOLIC BLOOD PRESSURE: 66 MMHG | SYSTOLIC BLOOD PRESSURE: 122 MMHG | HEIGHT: 73 IN | OXYGEN SATURATION: 100 %

## 2025-02-25 DIAGNOSIS — E11.8 TYPE 2 DIABETES MELLITUS WITH UNSPECIFIED COMPLICATIONS: ICD-10-CM

## 2025-02-25 DIAGNOSIS — Z96.41 PRESENCE OF INSULIN PUMP (EXTERNAL) (INTERNAL): ICD-10-CM

## 2025-02-25 DIAGNOSIS — Z96.41 TYPE 2 DIABETES MELLITUS WITH UNSPECIFIED COMPLICATIONS: ICD-10-CM

## 2025-02-25 DIAGNOSIS — E08.3511: ICD-10-CM

## 2025-02-25 DIAGNOSIS — E66.811 OBESITY, CLASS 1: ICD-10-CM

## 2025-02-25 DIAGNOSIS — I10 ESSENTIAL (PRIMARY) HYPERTENSION: ICD-10-CM

## 2025-02-25 DIAGNOSIS — N18.9 CHRONIC KIDNEY DISEASE, UNSPECIFIED: ICD-10-CM

## 2025-02-25 DIAGNOSIS — Z13.820 ENCOUNTER FOR SCREENING FOR OSTEOPOROSIS: ICD-10-CM

## 2025-02-25 DIAGNOSIS — E78.5 HYPERLIPIDEMIA, UNSPECIFIED: ICD-10-CM

## 2025-02-25 LAB — GLUCOSE BLDC GLUCOMTR-MCNC: 89

## 2025-02-25 PROCEDURE — 95251 CONT GLUC MNTR ANALYSIS I&R: CPT

## 2025-02-25 PROCEDURE — 99215 OFFICE O/P EST HI 40 MIN: CPT

## 2025-02-25 PROCEDURE — G2211 COMPLEX E/M VISIT ADD ON: CPT

## 2025-03-12 ENCOUNTER — APPOINTMENT (OUTPATIENT)
Dept: ENDOCRINOLOGY | Facility: CLINIC | Age: 80
End: 2025-03-12
Payer: MEDICARE

## 2025-03-12 PROCEDURE — 97803 MED NUTRITION INDIV SUBSEQ: CPT

## 2025-03-19 DIAGNOSIS — E11.8 TYPE 2 DIABETES MELLITUS WITH UNSPECIFIED COMPLICATIONS: ICD-10-CM

## 2025-03-19 DIAGNOSIS — Z96.41 TYPE 2 DIABETES MELLITUS WITH UNSPECIFIED COMPLICATIONS: ICD-10-CM

## 2025-03-19 RX ORDER — INSULIN PMP CART,AUT,G6/7,CNTR
EACH SUBCUTANEOUS
Qty: 6 | Refills: 3 | Status: ACTIVE | COMMUNITY
Start: 2025-03-19 | End: 1900-01-01

## 2025-04-15 ENCOUNTER — APPOINTMENT (OUTPATIENT)
Dept: ENDOCRINOLOGY | Facility: CLINIC | Age: 80
End: 2025-04-15

## 2025-05-22 ENCOUNTER — APPOINTMENT (OUTPATIENT)
Dept: ENDOCRINOLOGY | Facility: CLINIC | Age: 80
End: 2025-05-22

## 2025-05-22 VITALS
DIASTOLIC BLOOD PRESSURE: 78 MMHG | SYSTOLIC BLOOD PRESSURE: 118 MMHG | OXYGEN SATURATION: 97 % | BODY MASS INDEX: 26.64 KG/M2 | HEART RATE: 69 BPM | HEIGHT: 73 IN | WEIGHT: 201 LBS

## 2025-05-22 DIAGNOSIS — Z96.41 TYPE 2 DIABETES MELLITUS WITH UNSPECIFIED COMPLICATIONS: ICD-10-CM

## 2025-05-22 DIAGNOSIS — Z96.41 PRESENCE OF INSULIN PUMP (EXTERNAL) (INTERNAL): ICD-10-CM

## 2025-05-22 DIAGNOSIS — E11.8 TYPE 2 DIABETES MELLITUS WITH UNSPECIFIED COMPLICATIONS: ICD-10-CM

## 2025-05-22 DIAGNOSIS — I10 ESSENTIAL (PRIMARY) HYPERTENSION: ICD-10-CM

## 2025-05-22 DIAGNOSIS — E08.3511: ICD-10-CM

## 2025-05-22 DIAGNOSIS — Z13.820 ENCOUNTER FOR SCREENING FOR OSTEOPOROSIS: ICD-10-CM

## 2025-05-22 DIAGNOSIS — E66.811 OBESITY, CLASS 1: ICD-10-CM

## 2025-05-22 DIAGNOSIS — E78.5 HYPERLIPIDEMIA, UNSPECIFIED: ICD-10-CM

## 2025-05-22 DIAGNOSIS — N18.31 CHRONIC KIDNEY DISEASE, STAGE 3A: ICD-10-CM

## 2025-05-22 LAB — GLUCOSE BLDC GLUCOMTR-MCNC: 140

## 2025-05-22 PROCEDURE — 99215 OFFICE O/P EST HI 40 MIN: CPT | Mod: 25

## 2025-05-22 PROCEDURE — 95251 CONT GLUC MNTR ANALYSIS I&R: CPT

## 2025-06-02 ENCOUNTER — RX RENEWAL (OUTPATIENT)
Age: 80
End: 2025-06-02

## 2025-06-12 ENCOUNTER — RX RENEWAL (OUTPATIENT)
Age: 80
End: 2025-06-12

## 2025-07-02 ENCOUNTER — APPOINTMENT (OUTPATIENT)
Dept: CARDIOLOGY | Facility: CLINIC | Age: 80
End: 2025-07-02
Payer: MEDICARE

## 2025-07-02 ENCOUNTER — APPOINTMENT (OUTPATIENT)
Dept: ENDOCRINOLOGY | Facility: CLINIC | Age: 80
End: 2025-07-02
Payer: MEDICARE

## 2025-07-02 VITALS
WEIGHT: 194 LBS | RESPIRATION RATE: 14 BRPM | DIASTOLIC BLOOD PRESSURE: 62 MMHG | SYSTOLIC BLOOD PRESSURE: 123 MMHG | HEART RATE: 75 BPM | HEIGHT: 73 IN | OXYGEN SATURATION: 97 % | BODY MASS INDEX: 25.71 KG/M2

## 2025-07-02 VITALS
HEIGHT: 73 IN | WEIGHT: 194 LBS | OXYGEN SATURATION: 97 % | HEART RATE: 75 BPM | BODY MASS INDEX: 25.71 KG/M2 | SYSTOLIC BLOOD PRESSURE: 123 MMHG | DIASTOLIC BLOOD PRESSURE: 62 MMHG

## 2025-07-02 PROBLEM — Z76.89 ENCOUNTER TO ESTABLISH CARE: Status: RESOLVED | Noted: 2021-11-16 | Resolved: 2025-07-02

## 2025-07-02 PROCEDURE — 93000 ELECTROCARDIOGRAM COMPLETE: CPT

## 2025-07-02 PROCEDURE — 99214 OFFICE O/P EST MOD 30 MIN: CPT | Mod: 25

## 2025-07-02 PROCEDURE — 97803 MED NUTRITION INDIV SUBSEQ: CPT

## 2025-07-18 ENCOUNTER — RESULT REVIEW (OUTPATIENT)
Age: 80
End: 2025-07-18

## 2025-07-18 ENCOUNTER — APPOINTMENT (OUTPATIENT)
Dept: NEPHROLOGY | Facility: CLINIC | Age: 80
End: 2025-07-18

## 2025-07-18 VITALS
SYSTOLIC BLOOD PRESSURE: 110 MMHG | WEIGHT: 199 LBS | BODY MASS INDEX: 26.37 KG/M2 | OXYGEN SATURATION: 97 % | DIASTOLIC BLOOD PRESSURE: 50 MMHG | HEIGHT: 73 IN | HEART RATE: 65 BPM

## 2025-07-18 PROCEDURE — G2211 COMPLEX E/M VISIT ADD ON: CPT

## 2025-07-18 PROCEDURE — 99214 OFFICE O/P EST MOD 30 MIN: CPT

## 2025-07-24 ENCOUNTER — RX RENEWAL (OUTPATIENT)
Age: 80
End: 2025-07-24

## 2025-08-28 ENCOUNTER — LABORATORY RESULT (OUTPATIENT)
Age: 80
End: 2025-08-28

## 2025-09-02 ENCOUNTER — APPOINTMENT (OUTPATIENT)
Dept: ENDOCRINOLOGY | Facility: CLINIC | Age: 80
End: 2025-09-02
Payer: MEDICARE

## 2025-09-02 VITALS
DIASTOLIC BLOOD PRESSURE: 66 MMHG | HEIGHT: 73 IN | HEART RATE: 78 BPM | SYSTOLIC BLOOD PRESSURE: 128 MMHG | BODY MASS INDEX: 26.24 KG/M2 | WEIGHT: 198 LBS | OXYGEN SATURATION: 99 %

## 2025-09-02 DIAGNOSIS — Z96.41 PRESENCE OF INSULIN PUMP (EXTERNAL) (INTERNAL): ICD-10-CM

## 2025-09-02 DIAGNOSIS — E78.5 HYPERLIPIDEMIA, UNSPECIFIED: ICD-10-CM

## 2025-09-02 DIAGNOSIS — E66.811 OBESITY, CLASS 1: ICD-10-CM

## 2025-09-02 DIAGNOSIS — E08.3511: ICD-10-CM

## 2025-09-02 DIAGNOSIS — N18.31 CHRONIC KIDNEY DISEASE, STAGE 3A: ICD-10-CM

## 2025-09-02 DIAGNOSIS — E11.8 TYPE 2 DIABETES MELLITUS WITH UNSPECIFIED COMPLICATIONS: ICD-10-CM

## 2025-09-02 DIAGNOSIS — I10 ESSENTIAL (PRIMARY) HYPERTENSION: ICD-10-CM

## 2025-09-02 DIAGNOSIS — Z96.41 TYPE 2 DIABETES MELLITUS WITH UNSPECIFIED COMPLICATIONS: ICD-10-CM

## 2025-09-02 DIAGNOSIS — D50.0 IRON DEFICIENCY ANEMIA SECONDARY TO BLOOD LOSS (CHRONIC): ICD-10-CM

## 2025-09-02 LAB — GLUCOSE BLDC GLUCOMTR-MCNC: 146

## 2025-09-02 PROCEDURE — 99215 OFFICE O/P EST HI 40 MIN: CPT

## 2025-09-02 PROCEDURE — G2211 COMPLEX E/M VISIT ADD ON: CPT

## 2025-09-02 PROCEDURE — 95251 CONT GLUC MNTR ANALYSIS I&R: CPT

## 2025-09-02 RX ORDER — ASPIRIN 81 MG
81 TABLET, DELAYED RELEASE (ENTERIC COATED) ORAL
Refills: 0 | Status: ACTIVE | COMMUNITY